# Patient Record
Sex: MALE | Race: WHITE | Employment: FULL TIME | URBAN - METROPOLITAN AREA
[De-identification: names, ages, dates, MRNs, and addresses within clinical notes are randomized per-mention and may not be internally consistent; named-entity substitution may affect disease eponyms.]

---

## 2017-01-01 ENCOUNTER — HOSPITAL ENCOUNTER (OUTPATIENT)
Facility: HOSPITAL | Age: 47
Setting detail: HOSPITAL OUTPATIENT SURGERY
Discharge: HOME | End: 2017-01-01
Attending: INTERNAL MEDICINE | Admitting: INTERNAL MEDICINE
Payer: COMMERCIAL

## 2017-01-01 ENCOUNTER — SURGERY (OUTPATIENT)
Age: 47
End: 2017-01-01

## 2017-01-01 ENCOUNTER — HOSPITAL ENCOUNTER (OUTPATIENT)
Facility: HOSPITAL | Age: 47
Setting detail: OBSERVATION
Discharge: HOME | End: 2017-01-01
Attending: HOSPITALIST | Admitting: HOSPITALIST
Payer: COMMERCIAL

## 2017-01-01 VITALS
HEIGHT: 78 IN | BODY MASS INDEX: 24.88 KG/M2 | WEIGHT: 215 LBS | SYSTOLIC BLOOD PRESSURE: 102 MMHG | OXYGEN SATURATION: 95 % | RESPIRATION RATE: 18 BRPM | DIASTOLIC BLOOD PRESSURE: 66 MMHG | TEMPERATURE: 99 F | HEART RATE: 72 BPM

## 2017-01-01 VITALS
BODY MASS INDEX: 24 KG/M2 | WEIGHT: 209.31 LBS | HEART RATE: 82 BPM | OXYGEN SATURATION: 96 % | SYSTOLIC BLOOD PRESSURE: 115 MMHG | TEMPERATURE: 98 F | DIASTOLIC BLOOD PRESSURE: 72 MMHG | RESPIRATION RATE: 20 BRPM

## 2017-01-01 PROCEDURE — 85610 PROTHROMBIN TIME: CPT | Performed by: HOSPITALIST

## 2017-01-01 PROCEDURE — 96372 THER/PROPH/DIAG INJ SC/IM: CPT

## 2017-01-01 PROCEDURE — 88342 IMHCHEM/IMCYTCHM 1ST ANTB: CPT | Performed by: INTERNAL MEDICINE

## 2017-01-01 PROCEDURE — 96361 HYDRATE IV INFUSION ADD-ON: CPT

## 2017-01-01 PROCEDURE — 96376 TX/PRO/DX INJ SAME DRUG ADON: CPT

## 2017-01-01 PROCEDURE — 88172 CYTP DX EVAL FNA 1ST EA SITE: CPT | Performed by: INTERNAL MEDICINE

## 2017-01-01 PROCEDURE — 81235 EGFR GENE COM VARIANTS: CPT | Performed by: INTERNAL MEDICINE

## 2017-01-01 PROCEDURE — 88305 TISSUE EXAM BY PATHOLOGIST: CPT | Performed by: INTERNAL MEDICINE

## 2017-01-01 PROCEDURE — 88173 CYTOPATH EVAL FNA REPORT: CPT | Performed by: INTERNAL MEDICINE

## 2017-01-01 PROCEDURE — 88341 IMHCHEM/IMCYTCHM EA ADD ANTB: CPT | Performed by: INTERNAL MEDICINE

## 2017-01-01 PROCEDURE — 85025 COMPLETE CBC W/AUTO DIFF WBC: CPT | Performed by: HOSPITALIST

## 2017-01-01 PROCEDURE — 83735 ASSAY OF MAGNESIUM: CPT | Performed by: HOSPITALIST

## 2017-01-01 PROCEDURE — 80053 COMPREHEN METABOLIC PANEL: CPT | Performed by: HOSPITALIST

## 2017-01-01 PROCEDURE — 07978ZX DRAINAGE OF THORAX LYMPHATIC, VIA NATURAL OR ARTIFICIAL OPENING ENDOSCOPIC APPROACH, DIAGNOSTIC: ICD-10-PCS | Performed by: INTERNAL MEDICINE

## 2017-01-01 PROCEDURE — 85652 RBC SED RATE AUTOMATED: CPT | Performed by: HOSPITALIST

## 2017-01-01 PROCEDURE — 88381 MICRODISSECTION MANUAL: CPT | Performed by: INTERNAL MEDICINE

## 2017-01-01 PROCEDURE — 86140 C-REACTIVE PROTEIN: CPT | Performed by: HOSPITALIST

## 2017-01-01 PROCEDURE — 88360 TUMOR IMMUNOHISTOCHEM/MANUAL: CPT | Performed by: INTERNAL MEDICINE

## 2017-01-01 PROCEDURE — 87040 BLOOD CULTURE FOR BACTERIA: CPT | Performed by: HOSPITALIST

## 2017-01-01 PROCEDURE — 96365 THER/PROPH/DIAG IV INF INIT: CPT

## 2017-01-01 RX ORDER — ONDANSETRON 2 MG/ML
4 INJECTION INTRAMUSCULAR; INTRAVENOUS AS NEEDED
Status: DISCONTINUED | OUTPATIENT
Start: 2017-01-01 | End: 2017-01-01

## 2017-01-01 RX ORDER — ONDANSETRON 2 MG/ML
4 INJECTION INTRAMUSCULAR; INTRAVENOUS EVERY 6 HOURS PRN
Status: DISCONTINUED | OUTPATIENT
Start: 2017-01-01 | End: 2017-01-01

## 2017-01-01 RX ORDER — ENOXAPARIN SODIUM 100 MG/ML
1 INJECTION SUBCUTANEOUS EVERY 12 HOURS SCHEDULED
Status: DISCONTINUED | OUTPATIENT
Start: 2017-01-01 | End: 2017-01-01

## 2017-01-01 RX ORDER — NALOXONE HYDROCHLORIDE 0.4 MG/ML
80 INJECTION, SOLUTION INTRAMUSCULAR; INTRAVENOUS; SUBCUTANEOUS AS NEEDED
Status: DISCONTINUED | OUTPATIENT
Start: 2017-01-01 | End: 2017-01-01

## 2017-01-01 RX ORDER — CEPHALEXIN 500 MG/1
500 TABLET ORAL 4 TIMES DAILY
Qty: 40 TABLET | Refills: 0 | Status: SHIPPED | OUTPATIENT
Start: 2017-01-01 | End: 2017-01-01

## 2017-01-01 RX ORDER — HYDROMORPHONE HYDROCHLORIDE 1 MG/ML
0.4 INJECTION, SOLUTION INTRAMUSCULAR; INTRAVENOUS; SUBCUTANEOUS EVERY 5 MIN PRN
Status: DISCONTINUED | OUTPATIENT
Start: 2017-01-01 | End: 2017-01-01

## 2017-01-01 RX ORDER — ENOXAPARIN SODIUM 100 MG/ML
1 INJECTION SUBCUTANEOUS EVERY 12 HOURS SCHEDULED
Qty: 60 SYRINGE | Refills: 1 | Status: SHIPPED | OUTPATIENT
Start: 2017-01-01 | End: 2017-01-01

## 2017-01-01 RX ORDER — SODIUM CHLORIDE, SODIUM LACTATE, POTASSIUM CHLORIDE, CALCIUM CHLORIDE 600; 310; 30; 20 MG/100ML; MG/100ML; MG/100ML; MG/100ML
INJECTION, SOLUTION INTRAVENOUS CONTINUOUS
Status: CANCELLED | OUTPATIENT
Start: 2017-01-01

## 2017-01-01 RX ORDER — SODIUM CHLORIDE 9 MG/ML
INJECTION, SOLUTION INTRAVENOUS CONTINUOUS
Status: DISCONTINUED | OUTPATIENT
Start: 2017-01-01 | End: 2017-01-01

## 2017-01-01 RX ORDER — METOCLOPRAMIDE 10 MG/1
10 TABLET ORAL 3 TIMES DAILY PRN
Status: DISCONTINUED | OUTPATIENT
Start: 2017-01-01 | End: 2017-01-01

## 2017-01-01 RX ORDER — ONDANSETRON 4 MG/1
8 TABLET, FILM COATED ORAL EVERY 8 HOURS PRN
Status: DISCONTINUED | OUTPATIENT
Start: 2017-01-01 | End: 2017-01-01

## 2017-01-01 RX ORDER — ACETAMINOPHEN 325 MG/1
650 TABLET ORAL EVERY 6 HOURS PRN
Status: DISCONTINUED | OUTPATIENT
Start: 2017-01-01 | End: 2017-01-01

## 2017-01-01 RX ORDER — FOLIC ACID 1 MG/1
1 TABLET ORAL DAILY
Status: DISCONTINUED | OUTPATIENT
Start: 2017-01-01 | End: 2017-01-01

## 2017-01-01 RX ORDER — SCOLOPAMINE TRANSDERMAL SYSTEM 1 MG/1
1 PATCH, EXTENDED RELEASE TRANSDERMAL
Status: DISCONTINUED | OUTPATIENT
Start: 2017-01-01 | End: 2017-01-01

## 2017-01-01 RX ORDER — SODIUM CHLORIDE, SODIUM LACTATE, POTASSIUM CHLORIDE, CALCIUM CHLORIDE 600; 310; 30; 20 MG/100ML; MG/100ML; MG/100ML; MG/100ML
INJECTION, SOLUTION INTRAVENOUS CONTINUOUS
Status: DISCONTINUED | OUTPATIENT
Start: 2017-01-01 | End: 2017-01-01

## 2017-09-08 PROBLEM — R05.9 COUGH: Status: ACTIVE | Noted: 2017-01-01

## 2017-09-08 PROBLEM — J18.1 RIGHT UPPER LOBE CONSOLIDATION (HCC): Status: ACTIVE | Noted: 2017-01-01

## 2017-09-14 PROBLEM — M89.9 LESION OF VERTEBRA: Status: ACTIVE | Noted: 2017-01-01

## 2017-10-11 NOTE — OPERATIVE REPORT
EBUS Bronchoscopy Procedure Report     Preop diagnosis:        Abnormal CT scan, mediastinal adenopathy  Postop diagnosis:       Abnormal CT scan, mediastinal adenopathy  Procedure performed:  Bronchoscopy, EBUS-TBNA     Sedation used:      General Anesthe Patient tolerated the procedure well and was transferred to the recovery area. EBL was minimal    Samples obtained:                   1. EBUS-TBNA, station 4L lymph node                  2. EBUS-TBNA, station 4R lymph node. Recommendations:     Sebastian Pantoja

## 2017-10-11 NOTE — H&P
Preprocedure Note    Reviewed notes from Dr. Mona Abel dated 9/19//17. There has been no interval change. Plan is to proceed w/ bronch/EBUS-TBNA under anesthesia. Brandi Boothe M.D.   Pulmonary/Critical Care

## 2017-10-20 PROBLEM — C34.11 MALIGNANT NEOPLASM OF UPPER LOBE OF RIGHT LUNG (HCC): Status: ACTIVE | Noted: 2017-01-01

## 2017-11-17 PROBLEM — R11.0 NAUSEA: Status: ACTIVE | Noted: 2017-01-01

## 2017-11-19 PROBLEM — K21.9 ACID REFLUX: Status: ACTIVE | Noted: 2017-01-01

## 2017-11-21 PROBLEM — I82.629 ARM DVT (DEEP VENOUS THROMBOEMBOLISM), ACUTE (HCC): Status: ACTIVE | Noted: 2017-01-01

## 2017-11-21 NOTE — PROGRESS NOTES
No complaints of pain. Dr Dqeuan Garland notified, admission orders received. Dr Willis Ascencio notified by [facility] immediate care. Iv fluids initiated. Blood cultures drawn. Non productive cough, which patient states is chronic. sats 97% on room air. Slept well.  TELE on

## 2017-11-21 NOTE — PAYOR COMM NOTE
--------------  ADMISSION REVIEW     Payor: Thanh Boyer  #:  [id number]  Authorization Number: N/A    Admit date: 11/20/17  Admit time: 2257       Admitting Physician: Narcisa Crabtree DO  Attending Physician:  Madonna Ewing MD  Prim mg Subcutaneous (Left Lower Abdomen) Alesha Miguel, RN      folic acid (FOLVITE) tab 1 mg     Date Action Dose Route User    11/21/2017 0903 Given 1 mg Oral Miguel Solano RN      0.9%  NaCl infusion     Date Action Dose Route User    11/21/2017 177

## 2017-11-21 NOTE — PLAN OF CARE
NURSING ADMISSION NOTE      Patient admitted via cart  Oriented to room. Safety precautions initiated. Bed in low position. Call light in reach.

## 2017-11-21 NOTE — PROGRESS NOTES
Patient denies c/o pain at this time still has a dry nonproductive cough. l noted to eft arm redden area patient states that it looks much better than it did when he arrived. Able to move left arm without any problems. explained lovenox injection to patient

## 2017-11-21 NOTE — CONSULTS
[facility]  Report of Consultation    Kayce Hagen Patient Status:  Observation    1970 MRN [id number]   [facility] 4NW-A Attending Telly Sanchez MD   Hosp Day # 0 PCP Rochelle Felder MD     REASON FOR CONSULTATION:     Deep v Continuous  •  acetaminophen (TYLENOL) tab 650 mg, 650 mg, Oral, Q6H PRN  •  ondansetron HCl (ZOFRAN) injection 4 mg, 4 mg, Intravenous, Q6H PRN  •  enoxaparin sodium (LOVENOX) 100 MG/ML injection 100 mg, 1 mg/kg, Subcutaneous, 2 times per day  •  CeFAZoli Lab  11/20/17   1925  11/21/17   0629   RBC  4.74  4.84   HGB  13.5  13.7   HCT  39.9  40.5   MCV  84.2  83.7   MCH  28.5  28.3   MCHC  33.8  33.8   RDW  14.8  14.7   NEPRELIM   --   3.49   WBC  8.15  6.1   PLT  238  219.0             Imaging:    US  Occ

## 2017-11-21 NOTE — H&P
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 OBJECTIVE:  /74 (BP Location: Right arm)   Pulse 85   Temp 99.1 °F (37.3 °C) (Oral)   Resp 20   Wt 211 lb 3.2 oz (95.8 kg)   SpO2 98%   BMI 23.79 kg/m²   General:  Alert, no distress, appears stated age.    Head: restricted diffusion is noted. Axial gradient echo images demonstrate no microhemorrhages. No abnormal brain parenchymal signal is identified. With the administration of gadolinium, no abnormally enhancing lesions are identified.  No mass, mass effect, or m right upper lobe along with right hilar and right mediastinal lymphadenopathy. Us Venous Doppler Arm Left - Diag Img (cpt=[id number])    Result Date: 11/20/2017  LEFT UPPER EXTREMITY VENOUS DOPPLER ULTRASOUND CLINICAL HISTORY: Acute lymphangitis.  [employer], unenhanced study performed for radiation planning purposes only.             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

## 2017-11-22 NOTE — PROGRESS NOTES
Alert and oreinted x 4, v.s.s.  Ambulatory in room independently with steady gait no c/o pain or signs of distress, redness noted to Left upper arm within margins, continues on antibiotics, discharge pending, possibly tomorrow, pt comfortable this shift, co

## 2017-11-22 NOTE — DISCHARGE SUMMARY
General Medicine Discharge Summary     Patient ID:  Ayala Bazzi  52year old  5/25/1970    Admit date: 11/20/2017    Discharge date and time: 11/22/2017  2:33 PM     Attending Physician: Keshia Merida no LE swelling. At time of my interview states that the redness of the arm appears less intense, but that the area may have moved upward.      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     Ondansetron HCl (ZOFRAN) 8 MG tablet  Take 1 tablet (8 mg total) by mouth every 8 (eight) hours as needed for Nausea., Normal, Disp-20 tablet, R-3Every 8 hours prn nausea or as ins

## 2017-11-22 NOTE — PROGRESS NOTES
[facility]  Progress Note    Danielle Vassar Patient Status:  Observation    1970 MRN [id number]   [facility] 4NW-A Attending Doroteo Sarmiento MD   Hosp Day # 0 PCP Lyubov Huddleston MD     Subjective:    Left arm is less swollen  Oziel

## 2017-11-22 NOTE — CM/SW NOTE
Patient will have a $10 copay for Lovenox, it is ready to be picked up at his [address] in [place]. Patient is aware and agreeable.     Janie Croft RN, [facility]/  [phone]

## 2017-11-22 NOTE — PROGRESS NOTES
Patient discharged to home explained all medications to patient and follow up appt. Patient verbalized his understanding of teaching.  Patient gave himself his lovenox injection this am.

## 2017-11-22 NOTE — PROGRESS NOTES
Assumed patient care at 2300. Patient comfortably resting in bed. Alert and oriented X4. Left arm still noted with reddness and warm to touch. Maintained on IV Ancef for Left upper arm Cellulitis. Scheduled Lovenox for DVT of the Left arm as well.  No com

## 2017-12-07 PROBLEM — R74.8 ELEVATED LIVER ENZYMES: Status: ACTIVE | Noted: 2017-01-01

## 2017-12-07 PROBLEM — K59.00 CONSTIPATION: Status: ACTIVE | Noted: 2017-01-01

## 2017-12-22 PROBLEM — K22.89 ESOPHAGEAL PAIN: Status: ACTIVE | Noted: 2017-01-01

## 2018-01-01 ENCOUNTER — APPOINTMENT (OUTPATIENT)
Dept: CT IMAGING | Facility: HOSPITAL | Age: 48
DRG: 208 | End: 2018-01-01
Attending: HOSPITALIST
Payer: COMMERCIAL

## 2018-01-01 ENCOUNTER — APPOINTMENT (OUTPATIENT)
Dept: GENERAL RADIOLOGY | Facility: HOSPITAL | Age: 48
DRG: 208 | End: 2018-01-01
Attending: HOSPITALIST
Payer: COMMERCIAL

## 2018-01-01 ENCOUNTER — HOSPITAL ENCOUNTER (INPATIENT)
Facility: HOSPITAL | Age: 48
LOS: 22 days | DRG: 208 | End: 2018-01-01
Attending: EMERGENCY MEDICINE | Admitting: INTERNAL MEDICINE
Payer: COMMERCIAL

## 2018-01-01 ENCOUNTER — APPOINTMENT (OUTPATIENT)
Dept: GENERAL RADIOLOGY | Facility: HOSPITAL | Age: 48
DRG: 208 | End: 2018-01-01
Attending: INTERNAL MEDICINE
Payer: COMMERCIAL

## 2018-01-01 ENCOUNTER — HOSPITAL ENCOUNTER (INPATIENT)
Facility: HOSPITAL | Age: 48
LOS: 3 days | Discharge: HOME | DRG: 478 | End: 2018-01-01
Attending: EMERGENCY MEDICINE | Admitting: INTERNAL MEDICINE
Payer: COMMERCIAL

## 2018-01-01 ENCOUNTER — APPOINTMENT (OUTPATIENT)
Dept: ULTRASOUND IMAGING | Facility: HOSPITAL | Age: 48
DRG: 208 | End: 2018-01-01
Attending: INTERNAL MEDICINE
Payer: COMMERCIAL

## 2018-01-01 ENCOUNTER — APPOINTMENT (OUTPATIENT)
Dept: CV DIAGNOSTICS | Facility: HOSPITAL | Age: 48
DRG: 208 | End: 2018-01-01
Attending: INTERNAL MEDICINE
Payer: COMMERCIAL

## 2018-01-01 ENCOUNTER — APPOINTMENT (OUTPATIENT)
Dept: CT IMAGING | Facility: HOSPITAL | Age: 48
DRG: 208 | End: 2018-01-01
Attending: INTERNAL MEDICINE
Payer: COMMERCIAL

## 2018-01-01 ENCOUNTER — APPOINTMENT (OUTPATIENT)
Dept: MRI IMAGING | Facility: HOSPITAL | Age: 48
DRG: 208 | End: 2018-01-01
Attending: HOSPITALIST
Payer: COMMERCIAL

## 2018-01-01 ENCOUNTER — HOSPITAL ENCOUNTER (INPATIENT)
Facility: HOSPITAL | Age: 48
LOS: 5 days | Discharge: HOME | DRG: 369 | End: 2018-01-01
Attending: EMERGENCY MEDICINE | Admitting: INTERNAL MEDICINE
Payer: COMMERCIAL

## 2018-01-01 ENCOUNTER — APPOINTMENT (OUTPATIENT)
Dept: MRI IMAGING | Facility: HOSPITAL | Age: 48
DRG: 478 | End: 2018-01-01
Attending: HOSPITALIST
Payer: COMMERCIAL

## 2018-01-01 ENCOUNTER — APPOINTMENT (OUTPATIENT)
Dept: INTERVENTIONAL RADIOLOGY/VASCULAR | Facility: HOSPITAL | Age: 48
DRG: 478 | End: 2018-01-01
Attending: HOSPITALIST
Payer: COMMERCIAL

## 2018-01-01 ENCOUNTER — APPOINTMENT (OUTPATIENT)
Dept: GENERAL RADIOLOGY | Facility: HOSPITAL | Age: 48
DRG: 208 | End: 2018-01-01
Attending: EMERGENCY MEDICINE
Payer: COMMERCIAL

## 2018-01-01 ENCOUNTER — APPOINTMENT (OUTPATIENT)
Dept: CT IMAGING | Facility: HOSPITAL | Age: 48
DRG: 208 | End: 2018-01-01
Attending: EMERGENCY MEDICINE
Payer: COMMERCIAL

## 2018-01-01 ENCOUNTER — ANESTHESIA EVENT (OUTPATIENT)
Dept: MEDSURG UNIT | Facility: HOSPITAL | Age: 48
DRG: 208 | End: 2018-01-01
Payer: COMMERCIAL

## 2018-01-01 ENCOUNTER — TELEPHONE (OUTPATIENT)
Dept: HEMATOLOGY/ONCOLOGY | Facility: HOSPITAL | Age: 48
End: 2018-01-01

## 2018-01-01 ENCOUNTER — ANESTHESIA (OUTPATIENT)
Dept: MEDSURG UNIT | Facility: HOSPITAL | Age: 48
DRG: 208 | End: 2018-01-01
Payer: COMMERCIAL

## 2018-01-01 VITALS
DIASTOLIC BLOOD PRESSURE: 51 MMHG | OXYGEN SATURATION: 14 % | HEIGHT: 78 IN | SYSTOLIC BLOOD PRESSURE: 73 MMHG | TEMPERATURE: 101 F | BODY MASS INDEX: 16.43 KG/M2 | WEIGHT: 142 LBS

## 2018-01-01 VITALS
OXYGEN SATURATION: 91 % | BODY MASS INDEX: 19.8 KG/M2 | HEART RATE: 105 BPM | SYSTOLIC BLOOD PRESSURE: 149 MMHG | DIASTOLIC BLOOD PRESSURE: 106 MMHG | RESPIRATION RATE: 16 BRPM | HEIGHT: 78 IN | WEIGHT: 171.13 LBS | TEMPERATURE: 98 F

## 2018-01-01 VITALS
OXYGEN SATURATION: 100 % | SYSTOLIC BLOOD PRESSURE: 145 MMHG | BODY MASS INDEX: 20.34 KG/M2 | HEIGHT: 78 IN | DIASTOLIC BLOOD PRESSURE: 90 MMHG | TEMPERATURE: 98 F | WEIGHT: 175.81 LBS | RESPIRATION RATE: 18 BRPM | HEART RATE: 86 BPM

## 2018-01-01 DIAGNOSIS — A41.9 SEPSIS DUE TO PNEUMONIA (HCC): ICD-10-CM

## 2018-01-01 DIAGNOSIS — R09.02 HYPOXIA: ICD-10-CM

## 2018-01-01 DIAGNOSIS — R11.10 INTRACTABLE VOMITING, PRESENCE OF NAUSEA NOT SPECIFIED, UNSPECIFIED VOMITING TYPE: Primary | ICD-10-CM

## 2018-01-01 DIAGNOSIS — E86.0 DEHYDRATION: ICD-10-CM

## 2018-01-01 DIAGNOSIS — M54.5 LOW BACK PAIN, UNSPECIFIED BACK PAIN LATERALITY, UNSPECIFIED CHRONICITY, WITH SCIATICA PRESENCE UNSPECIFIED: ICD-10-CM

## 2018-01-01 DIAGNOSIS — J20.9 ACUTE BRONCHITIS, UNSPECIFIED ORGANISM: Primary | ICD-10-CM

## 2018-01-01 DIAGNOSIS — R52 INTRACTABLE PAIN: Primary | ICD-10-CM

## 2018-01-01 DIAGNOSIS — R63.4 WEIGHT LOSS: ICD-10-CM

## 2018-01-01 DIAGNOSIS — C34.11 MALIGNANT NEOPLASM OF UPPER LOBE OF RIGHT LUNG (HCC): ICD-10-CM

## 2018-01-01 DIAGNOSIS — R11.2 INTRACTABLE VOMITING WITH NAUSEA, UNSPECIFIED VOMITING TYPE: ICD-10-CM

## 2018-01-01 DIAGNOSIS — K22.89 ESOPHAGEAL PAIN: ICD-10-CM

## 2018-01-01 DIAGNOSIS — N17.9 ACUTE KIDNEY INJURY (HCC): ICD-10-CM

## 2018-01-01 DIAGNOSIS — E87.6 HYPOKALEMIA: ICD-10-CM

## 2018-01-01 DIAGNOSIS — R07.9 RIGHT-SIDED CHEST PAIN: ICD-10-CM

## 2018-01-01 DIAGNOSIS — J18.9 SEPSIS DUE TO PNEUMONIA (HCC): ICD-10-CM

## 2018-01-01 DIAGNOSIS — E87.1 HYPONATREMIA: ICD-10-CM

## 2018-01-01 DIAGNOSIS — C34.90 PRIMARY MALIGNANT NEOPLASM OF LUNG METASTATIC TO OTHER SITE, UNSPECIFIED LATERALITY (HCC): ICD-10-CM

## 2018-01-01 LAB
ANION GAP SERPL CALC-SCNC: 11 MMOL/L (ref 0–18)
ANION GAP SERPL CALC-SCNC: 15 MMOL/L (ref 0–18)
ANION GAP SERPL CALC-SCNC: 6 MMOL/L (ref 0–18)
ANION GAP SERPL CALC-SCNC: 8 MMOL/L (ref 0–18)
BASOPHILS # BLD: 0 K/UL (ref 0–0.2)
BASOPHILS NFR BLD: 0 %
BUN SERPL-MCNC: 10 MG/DL (ref 8–20)
BUN SERPL-MCNC: 5 MG/DL (ref 8–20)
BUN SERPL-MCNC: 6 MG/DL (ref 8–20)
BUN SERPL-MCNC: 7 MG/DL (ref 8–20)
BUN SERPL-MCNC: 7 MG/DL (ref 8–20)
BUN SERPL-MCNC: 9 MG/DL (ref 8–20)
BUN SERPL-MCNC: 9 MG/DL (ref 8–20)
BUN/CREAT SERPL: 10.5 (ref 10–20)
BUN/CREAT SERPL: 4.8 (ref 10–20)
BUN/CREAT SERPL: 5.2 (ref 10–20)
BUN/CREAT SERPL: 5.4 (ref 10–20)
BUN/CREAT SERPL: 6.1 (ref 10–20)
BUN/CREAT SERPL: 8.3 (ref 10–20)
BUN/CREAT SERPL: 8.5 (ref 10–20)
CALCIUM SERPL-MCNC: 8.6 MG/DL (ref 8.5–10.5)
CALCIUM SERPL-MCNC: 8.8 MG/DL (ref 8.5–10.5)
CALCIUM SERPL-MCNC: 8.8 MG/DL (ref 8.5–10.5)
CALCIUM SERPL-MCNC: 8.9 MG/DL (ref 8.5–10.5)
CALCIUM SERPL-MCNC: 9.1 MG/DL (ref 8.5–10.5)
CALCIUM SERPL-MCNC: 9.2 MG/DL (ref 8.5–10.5)
CALCIUM SERPL-MCNC: 9.4 MG/DL (ref 8.5–10.5)
CHLORIDE SERPL-SCNC: 102 MMOL/L (ref 95–110)
CHLORIDE SERPL-SCNC: 103 MMOL/L (ref 95–110)
CHLORIDE SERPL-SCNC: 106 MMOL/L (ref 95–110)
CHLORIDE SERPL-SCNC: 96 MMOL/L (ref 95–110)
CHLORIDE SERPL-SCNC: 97 MMOL/L (ref 95–110)
CO2 SERPL-SCNC: 23 MMOL/L (ref 22–32)
CO2 SERPL-SCNC: 24 MMOL/L (ref 22–32)
CO2 SERPL-SCNC: 25 MMOL/L (ref 22–32)
CO2 SERPL-SCNC: 26 MMOL/L (ref 22–32)
CREAT SERPL-MCNC: 0.95 MG/DL (ref 0.5–1.5)
CREAT SERPL-MCNC: 1.05 MG/DL (ref 0.5–1.5)
CREAT SERPL-MCNC: 1.06 MG/DL (ref 0.5–1.5)
CREAT SERPL-MCNC: 1.09 MG/DL (ref 0.5–1.5)
CREAT SERPL-MCNC: 1.14 MG/DL (ref 0.5–1.5)
CREAT SERPL-MCNC: 1.16 MG/DL (ref 0.5–1.5)
CREAT SERPL-MCNC: 1.3 MG/DL (ref 0.5–1.5)
EOSINOPHIL # BLD: 0 K/UL (ref 0–0.7)
EOSINOPHIL # BLD: 0.1 K/UL (ref 0–0.7)
EOSINOPHIL # BLD: 0.1 K/UL (ref 0–0.7)
EOSINOPHIL NFR BLD: 0 %
EOSINOPHIL NFR BLD: 0 %
EOSINOPHIL NFR BLD: 1 %
EOSINOPHIL NFR BLD: 3 %
EOSINOPHIL NFR BLD: 3 %
ERYTHROCYTE [DISTWIDTH] IN BLOOD BY AUTOMATED COUNT: 16.6 % (ref 11–15)
ERYTHROCYTE [DISTWIDTH] IN BLOOD BY AUTOMATED COUNT: 16.6 % (ref 11–15)
ERYTHROCYTE [DISTWIDTH] IN BLOOD BY AUTOMATED COUNT: 16.7 % (ref 11–15)
ERYTHROCYTE [DISTWIDTH] IN BLOOD BY AUTOMATED COUNT: 17 % (ref 11–15)
ERYTHROCYTE [DISTWIDTH] IN BLOOD BY AUTOMATED COUNT: 17.2 % (ref 11–15)
GLUCOSE SERPL-MCNC: 100 MG/DL (ref 70–99)
GLUCOSE SERPL-MCNC: 102 MG/DL (ref 70–99)
GLUCOSE SERPL-MCNC: 103 MG/DL (ref 70–99)
GLUCOSE SERPL-MCNC: 119 MG/DL (ref 70–99)
GLUCOSE SERPL-MCNC: 127 MG/DL (ref 70–99)
GLUCOSE SERPL-MCNC: 136 MG/DL (ref 70–99)
GLUCOSE SERPL-MCNC: 148 MG/DL (ref 70–99)
HCT VFR BLD AUTO: 23.9 % (ref 41–52)
HCT VFR BLD AUTO: 24.5 % (ref 41–52)
HCT VFR BLD AUTO: 25.8 % (ref 41–52)
HCT VFR BLD AUTO: 26.7 % (ref 41–52)
HCT VFR BLD AUTO: 27.1 % (ref 41–52)
HGB BLD-MCNC: 8.4 G/DL (ref 13.5–17.5)
HGB BLD-MCNC: 8.4 G/DL (ref 13.5–17.5)
HGB BLD-MCNC: 9.1 G/DL (ref 13.5–17.5)
HGB BLD-MCNC: 9.2 G/DL (ref 13.5–17.5)
HGB BLD-MCNC: 9.5 G/DL (ref 13.5–17.5)
LYMPHOCYTES # BLD: 0.6 K/UL (ref 1–4)
LYMPHOCYTES # BLD: 0.7 K/UL (ref 1–4)
LYMPHOCYTES # BLD: 1.3 K/UL (ref 1–4)
LYMPHOCYTES # BLD: 1.4 K/UL (ref 1–4)
LYMPHOCYTES # BLD: 1.7 K/UL (ref 1–4)
LYMPHOCYTES NFR BLD: 35 %
LYMPHOCYTES NFR BLD: 35 %
LYMPHOCYTES NFR BLD: 38 %
LYMPHOCYTES NFR BLD: 50 %
LYMPHOCYTES NFR BLD: 57 %
MCH RBC QN AUTO: 30.7 PG (ref 27–32)
MCH RBC QN AUTO: 30.9 PG (ref 27–32)
MCH RBC QN AUTO: 31.1 PG (ref 27–32)
MCH RBC QN AUTO: 31.1 PG (ref 27–32)
MCH RBC QN AUTO: 31.5 PG (ref 27–32)
MCHC RBC AUTO-ENTMCNC: 34.2 G/DL (ref 32–37)
MCHC RBC AUTO-ENTMCNC: 34.4 G/DL (ref 32–37)
MCHC RBC AUTO-ENTMCNC: 34.9 G/DL (ref 32–37)
MCHC RBC AUTO-ENTMCNC: 35 G/DL (ref 32–37)
MCHC RBC AUTO-ENTMCNC: 35.4 G/DL (ref 32–37)
MCV RBC AUTO: 88.5 FL (ref 80–100)
MCV RBC AUTO: 89 FL (ref 80–100)
MCV RBC AUTO: 89.1 FL (ref 80–100)
MCV RBC AUTO: 89.8 FL (ref 80–100)
MCV RBC AUTO: 90.3 FL (ref 80–100)
MONOCYTES # BLD: 0.4 K/UL (ref 0–1)
MONOCYTES # BLD: 0.5 K/UL (ref 0–1)
MONOCYTES # BLD: 0.7 K/UL (ref 0–1)
MONOCYTES NFR BLD: 13 %
MONOCYTES NFR BLD: 13 %
MONOCYTES NFR BLD: 19 %
MONOCYTES NFR BLD: 21 %
MONOCYTES NFR BLD: 27 %
NEUTROPHILS # BLD AUTO: 0.7 K/UL (ref 1.8–7.7)
NEUTROPHILS # BLD AUTO: 0.7 K/UL (ref 1.8–7.7)
NEUTROPHILS # BLD AUTO: 0.8 K/UL (ref 1.8–7.7)
NEUTROPHILS # BLD AUTO: 1 K/UL (ref 1.8–7.7)
NEUTROPHILS # BLD AUTO: 1.6 K/UL (ref 1.8–7.7)
NEUTROPHILS NFR BLD: 28 %
NEUTROPHILS NFR BLD: 36 %
NEUTROPHILS NFR BLD: 37 %
NEUTROPHILS NFR BLD: 41 %
NEUTROPHILS NFR BLD: 44 %
OSMOLALITY UR CALC.SUM OF ELEC: 267 MOSM/KG (ref 275–295)
OSMOLALITY UR CALC.SUM OF ELEC: 269 MOSM/KG (ref 275–295)
OSMOLALITY UR CALC.SUM OF ELEC: 274 MOSM/KG (ref 275–295)
OSMOLALITY UR CALC.SUM OF ELEC: 278 MOSM/KG (ref 275–295)
OSMOLALITY UR CALC.SUM OF ELEC: 280 MOSM/KG (ref 275–295)
OSMOLALITY UR CALC.SUM OF ELEC: 280 MOSM/KG (ref 275–295)
OSMOLALITY UR CALC.SUM OF ELEC: 282 MOSM/KG (ref 275–295)
PLATELET # BLD AUTO: 207 K/UL (ref 140–400)
PLATELET # BLD AUTO: 245 K/UL (ref 140–400)
PLATELET # BLD AUTO: 262 K/UL (ref 140–400)
PLATELET # BLD AUTO: 319 K/UL (ref 140–400)
PLATELET # BLD AUTO: 356 K/UL (ref 140–400)
PMV BLD AUTO: 7.3 FL (ref 7.4–10.3)
PMV BLD AUTO: 7.6 FL (ref 7.4–10.3)
PMV BLD AUTO: 7.7 FL (ref 7.4–10.3)
PMV BLD AUTO: 7.7 FL (ref 7.4–10.3)
PMV BLD AUTO: 7.8 FL (ref 7.4–10.3)
POTASSIUM SERPL-SCNC: 3 MMOL/L (ref 3.3–5.1)
POTASSIUM SERPL-SCNC: 3.3 MMOL/L (ref 3.3–5.1)
POTASSIUM SERPL-SCNC: 3.3 MMOL/L (ref 3.3–5.1)
POTASSIUM SERPL-SCNC: 3.4 MMOL/L (ref 3.3–5.1)
POTASSIUM SERPL-SCNC: 3.5 MMOL/L (ref 3.3–5.1)
POTASSIUM SERPL-SCNC: 3.9 MMOL/L (ref 3.3–5.1)
RBC # BLD AUTO: 2.68 M/UL (ref 4.5–5.9)
RBC # BLD AUTO: 2.73 M/UL (ref 4.5–5.9)
RBC # BLD AUTO: 2.9 M/UL (ref 4.5–5.9)
RBC # BLD AUTO: 2.96 M/UL (ref 4.5–5.9)
RBC # BLD AUTO: 3.06 M/UL (ref 4.5–5.9)
SODIUM SERPL-SCNC: 128 MMOL/L (ref 136–144)
SODIUM SERPL-SCNC: 129 MMOL/L (ref 136–144)
SODIUM SERPL-SCNC: 132 MMOL/L (ref 136–144)
SODIUM SERPL-SCNC: 134 MMOL/L (ref 136–144)
SODIUM SERPL-SCNC: 136 MMOL/L (ref 136–144)
SODIUM SERPL-SCNC: 136 MMOL/L (ref 136–144)
SODIUM SERPL-SCNC: 137 MMOL/L (ref 136–144)
WBC # BLD AUTO: 1.7 K/UL (ref 4–11)
WBC # BLD AUTO: 2 K/UL (ref 4–11)
WBC # BLD AUTO: 2.7 K/UL (ref 4–11)
WBC # BLD AUTO: 2.9 K/UL (ref 4–11)
WBC # BLD AUTO: 3.6 K/UL (ref 4–11)

## 2018-01-01 PROCEDURE — 99252 IP/OBS CONSLTJ NEW/EST SF 35: CPT | Performed by: INTERNAL MEDICINE

## 2018-01-01 PROCEDURE — 72158 MRI LUMBAR SPINE W/O & W/DYE: CPT | Performed by: HOSPITALIST

## 2018-01-01 PROCEDURE — 99285 EMERGENCY DEPT VISIT HI MDM: CPT

## 2018-01-01 PROCEDURE — 84300 ASSAY OF URINE SODIUM: CPT | Performed by: PHYSICIAN ASSISTANT

## 2018-01-01 PROCEDURE — 71045 X-RAY EXAM CHEST 1 VIEW: CPT | Performed by: INTERNAL MEDICINE

## 2018-01-01 PROCEDURE — 71045 X-RAY EXAM CHEST 1 VIEW: CPT | Performed by: HOSPITALIST

## 2018-01-01 PROCEDURE — 93010 ELECTROCARDIOGRAM REPORT: CPT | Performed by: EMERGENCY MEDICINE

## 2018-01-01 PROCEDURE — 88333 PATH CONSLTJ SURG CYTO XM 1: CPT | Performed by: HOSPITALIST

## 2018-01-01 PROCEDURE — 96375 TX/PRO/DX INJ NEW DRUG ADDON: CPT

## 2018-01-01 PROCEDURE — 83735 ASSAY OF MAGNESIUM: CPT | Performed by: EMERGENCY MEDICINE

## 2018-01-01 PROCEDURE — 80076 HEPATIC FUNCTION PANEL: CPT | Performed by: EMERGENCY MEDICINE

## 2018-01-01 PROCEDURE — 99231 SBSQ HOSP IP/OBS SF/LOW 25: CPT | Performed by: INTERNAL MEDICINE

## 2018-01-01 PROCEDURE — 71260 CT THORAX DX C+: CPT | Performed by: EMERGENCY MEDICINE

## 2018-01-01 PROCEDURE — 80048 BASIC METABOLIC PNL TOTAL CA: CPT | Performed by: EMERGENCY MEDICINE

## 2018-01-01 PROCEDURE — 99233 SBSQ HOSP IP/OBS HIGH 50: CPT | Performed by: INTERNAL MEDICINE

## 2018-01-01 PROCEDURE — 96376 TX/PRO/DX INJ SAME DRUG ADON: CPT

## 2018-01-01 PROCEDURE — 85025 COMPLETE CBC W/AUTO DIFF WBC: CPT | Performed by: HOSPITALIST

## 2018-01-01 PROCEDURE — 96361 HYDRATE IV INFUSION ADD-ON: CPT

## 2018-01-01 PROCEDURE — 02HV33Z INSERTION OF INFUSION DEVICE INTO SUPERIOR VENA CAVA, PERCUTANEOUS APPROACH: ICD-10-PCS | Performed by: HOSPITALIST

## 2018-01-01 PROCEDURE — 88374 M/PHMTRC ALYS ISHQUANT/SEMIQ: CPT | Performed by: INTERNAL MEDICINE

## 2018-01-01 PROCEDURE — A4216 STERILE WATER/SALINE, 10 ML: HCPCS | Performed by: INTERNAL MEDICINE

## 2018-01-01 PROCEDURE — 88177 CYTP FNA EVAL EA ADDL: CPT | Performed by: HOSPITALIST

## 2018-01-01 PROCEDURE — 81210 BRAF GENE: CPT | Performed by: INTERNAL MEDICINE

## 2018-01-01 PROCEDURE — 76705 ECHO EXAM OF ABDOMEN: CPT | Performed by: INTERNAL MEDICINE

## 2018-01-01 PROCEDURE — A9575 INJ GADOTERATE MEGLUMI 0.1ML: HCPCS | Performed by: HOSPITALIST

## 2018-01-01 PROCEDURE — 80048 BASIC METABOLIC PNL TOTAL CA: CPT | Performed by: HOSPITALIST

## 2018-01-01 PROCEDURE — 85025 COMPLETE CBC W/AUTO DIFF WBC: CPT | Performed by: EMERGENCY MEDICINE

## 2018-01-01 PROCEDURE — 99231 SBSQ HOSP IP/OBS SF/LOW 25: CPT | Performed by: OTHER

## 2018-01-01 PROCEDURE — 85025 COMPLETE CBC W/AUTO DIFF WBC: CPT | Performed by: INTERNAL MEDICINE

## 2018-01-01 PROCEDURE — 99152 MOD SED SAME PHYS/QHP 5/>YRS: CPT

## 2018-01-01 PROCEDURE — 85014 HEMATOCRIT: CPT | Performed by: HOSPITALIST

## 2018-01-01 PROCEDURE — 88307 TISSUE EXAM BY PATHOLOGIST: CPT | Performed by: HOSPITALIST

## 2018-01-01 PROCEDURE — 88341 IMHCHEM/IMCYTCHM EA ADD ANTB: CPT | Performed by: HOSPITALIST

## 2018-01-01 PROCEDURE — 0W993ZZ DRAINAGE OF RIGHT PLEURAL CAVITY, PERCUTANEOUS APPROACH: ICD-10-PCS | Performed by: INTERNAL MEDICINE

## 2018-01-01 PROCEDURE — 93306 TTE W/DOPPLER COMPLETE: CPT | Performed by: INTERNAL MEDICINE

## 2018-01-01 PROCEDURE — 20225 BONE BIOPSY TROCAR/NDL DEEP: CPT

## 2018-01-01 PROCEDURE — 71045 X-RAY EXAM CHEST 1 VIEW: CPT | Performed by: EMERGENCY MEDICINE

## 2018-01-01 PROCEDURE — 71260 CT THORAX DX C+: CPT | Performed by: INTERNAL MEDICINE

## 2018-01-01 PROCEDURE — 88360 TUMOR IMMUNOHISTOCHEM/MANUAL: CPT | Performed by: INTERNAL MEDICINE

## 2018-01-01 PROCEDURE — 88305 TISSUE EXAM BY PATHOLOGIST: CPT | Performed by: HOSPITALIST

## 2018-01-01 PROCEDURE — 88311 DECALCIFY TISSUE: CPT | Performed by: HOSPITALIST

## 2018-01-01 PROCEDURE — 81235 EGFR GENE COM VARIANTS: CPT | Performed by: INTERNAL MEDICINE

## 2018-01-01 PROCEDURE — 83735 ASSAY OF MAGNESIUM: CPT | Performed by: HOSPITALIST

## 2018-01-01 PROCEDURE — 96374 THER/PROPH/DIAG INJ IV PUSH: CPT

## 2018-01-01 PROCEDURE — 83690 ASSAY OF LIPASE: CPT | Performed by: EMERGENCY MEDICINE

## 2018-01-01 PROCEDURE — 84132 ASSAY OF SERUM POTASSIUM: CPT | Performed by: HOSPITALIST

## 2018-01-01 PROCEDURE — 99356 PROLONGED SERV,INPATIENT,1ST HR: CPT | Performed by: INTERNAL MEDICINE

## 2018-01-01 PROCEDURE — 81003 URINALYSIS AUTO W/O SCOPE: CPT | Performed by: EMERGENCY MEDICINE

## 2018-01-01 PROCEDURE — 93005 ELECTROCARDIOGRAM TRACING: CPT

## 2018-01-01 PROCEDURE — 77002 NEEDLE LOCALIZATION BY XRAY: CPT

## 2018-01-01 PROCEDURE — 93970 EXTREMITY STUDY: CPT | Performed by: INTERNAL MEDICINE

## 2018-01-01 PROCEDURE — 5A09457 ASSISTANCE WITH RESPIRATORY VENTILATION, 24-96 CONSECUTIVE HOURS, CONTINUOUS POSITIVE AIRWAY PRESSURE: ICD-10-PCS | Performed by: HOSPITALIST

## 2018-01-01 PROCEDURE — 70450 CT HEAD/BRAIN W/O DYE: CPT | Performed by: HOSPITALIST

## 2018-01-01 PROCEDURE — 5A1945Z RESPIRATORY VENTILATION, 24-96 CONSECUTIVE HOURS: ICD-10-PCS | Performed by: HOSPITALIST

## 2018-01-01 PROCEDURE — 99254 IP/OBS CNSLTJ NEW/EST MOD 60: CPT | Performed by: OTHER

## 2018-01-01 PROCEDURE — 80048 BASIC METABOLIC PNL TOTAL CA: CPT | Performed by: INTERNAL MEDICINE

## 2018-01-01 PROCEDURE — 32555 ASPIRATE PLEURA W/ IMAGING: CPT | Performed by: INTERNAL MEDICINE

## 2018-01-01 PROCEDURE — 0W9B3ZZ DRAINAGE OF LEFT PLEURAL CAVITY, PERCUTANEOUS APPROACH: ICD-10-PCS | Performed by: INTERNAL MEDICINE

## 2018-01-01 PROCEDURE — 0QB03ZX EXCISION OF LUMBAR VERTEBRA, PERCUTANEOUS APPROACH, DIAGNOSTIC: ICD-10-PCS | Performed by: RADIOLOGY

## 2018-01-01 PROCEDURE — 74018 RADEX ABDOMEN 1 VIEW: CPT | Performed by: INTERNAL MEDICINE

## 2018-01-01 PROCEDURE — 30233N1 TRANSFUSION OF NONAUTOLOGOUS RED BLOOD CELLS INTO PERIPHERAL VEIN, PERCUTANEOUS APPROACH: ICD-10-PCS | Performed by: HOSPITALIST

## 2018-01-01 PROCEDURE — 85018 HEMOGLOBIN: CPT | Performed by: HOSPITALIST

## 2018-01-01 PROCEDURE — 99232 SBSQ HOSP IP/OBS MODERATE 35: CPT | Performed by: INTERNAL MEDICINE

## 2018-01-01 PROCEDURE — 0BH17EZ INSERTION OF ENDOTRACHEAL AIRWAY INTO TRACHEA, VIA NATURAL OR ARTIFICIAL OPENING: ICD-10-PCS | Performed by: HOSPITALIST

## 2018-01-01 PROCEDURE — 88342 IMHCHEM/IMCYTCHM 1ST ANTB: CPT | Performed by: HOSPITALIST

## 2018-01-01 PROCEDURE — 88172 CYTP DX EVAL FNA 1ST EA SITE: CPT | Performed by: HOSPITALIST

## 2018-01-01 PROCEDURE — 93308 TTE F-UP OR LMTD: CPT | Performed by: INTERNAL MEDICINE

## 2018-01-01 PROCEDURE — 70553 MRI BRAIN STEM W/O & W/DYE: CPT | Performed by: HOSPITALIST

## 2018-01-01 PROCEDURE — 84132 ASSAY OF SERUM POTASSIUM: CPT | Performed by: INTERNAL MEDICINE

## 2018-01-01 PROCEDURE — 71046 X-RAY EXAM CHEST 2 VIEWS: CPT | Performed by: EMERGENCY MEDICINE

## 2018-01-01 PROCEDURE — 88173 CYTOPATH EVAL FNA REPORT: CPT | Performed by: HOSPITALIST

## 2018-01-01 RX ORDER — IPRATROPIUM BROMIDE AND ALBUTEROL SULFATE 2.5; .5 MG/3ML; MG/3ML
3 SOLUTION RESPIRATORY (INHALATION)
Status: DISCONTINUED | OUTPATIENT
Start: 2018-01-01 | End: 2018-01-01

## 2018-01-01 RX ORDER — FLUCONAZOLE 200 MG/1
200 TABLET ORAL DAILY
Status: DISCONTINUED | OUTPATIENT
Start: 2018-01-01 | End: 2018-01-01 | Stop reason: ALTCHOICE

## 2018-01-01 RX ORDER — PREDNISONE 20 MG/1
40 TABLET ORAL
Status: DISCONTINUED | OUTPATIENT
Start: 2018-01-01 | End: 2018-01-01

## 2018-01-01 RX ORDER — HEPARIN SODIUM 5000 [USP'U]/ML
5000 INJECTION, SOLUTION INTRAVENOUS; SUBCUTANEOUS EVERY 8 HOURS SCHEDULED
Status: DISCONTINUED | OUTPATIENT
Start: 2018-01-01 | End: 2018-01-01

## 2018-01-01 RX ORDER — POLYETHYLENE GLYCOL 3350 17 G/17G
17 POWDER, FOR SOLUTION ORAL DAILY PRN
Status: DISCONTINUED | OUTPATIENT
Start: 2018-01-01 | End: 2018-01-01

## 2018-01-01 RX ORDER — SODIUM PHOSPHATE, DIBASIC AND SODIUM PHOSPHATE, MONOBASIC 7; 19 G/133ML; G/133ML
1 ENEMA RECTAL ONCE AS NEEDED
Status: DISCONTINUED | OUTPATIENT
Start: 2018-01-01 | End: 2018-01-01

## 2018-01-01 RX ORDER — HYDROMORPHONE HYDROCHLORIDE 1 MG/ML
0.8 INJECTION, SOLUTION INTRAMUSCULAR; INTRAVENOUS; SUBCUTANEOUS EVERY 2 HOUR PRN
Status: DISCONTINUED | OUTPATIENT
Start: 2018-01-01 | End: 2018-01-01

## 2018-01-01 RX ORDER — SODIUM CHLORIDE 9 MG/ML
INJECTION, SOLUTION INTRAVENOUS CONTINUOUS
Status: DISPENSED | OUTPATIENT
Start: 2018-01-01 | End: 2018-01-01

## 2018-01-01 RX ORDER — MORPHINE SULFATE 30 MG/1
30 TABLET ORAL EVERY 4 HOURS PRN
Qty: 60 TABLET | Refills: 0 | Status: ON HOLD | OUTPATIENT
Start: 2018-01-01 | End: 2018-01-01

## 2018-01-01 RX ORDER — ONDANSETRON 2 MG/ML
4 INJECTION INTRAMUSCULAR; INTRAVENOUS ONCE
Status: COMPLETED | OUTPATIENT
Start: 2018-01-01 | End: 2018-01-01

## 2018-01-01 RX ORDER — SODIUM CHLORIDE 0.9 % (FLUSH) 0.9 %
3 SYRINGE (ML) INJECTION AS NEEDED
Status: DISCONTINUED | OUTPATIENT
Start: 2018-01-01 | End: 2018-01-01

## 2018-01-01 RX ORDER — HYDROCODONE BITARTRATE AND ACETAMINOPHEN 10; 325 MG/1; MG/1
1 TABLET ORAL EVERY 4 HOURS PRN
Status: DISCONTINUED | OUTPATIENT
Start: 2018-01-01 | End: 2018-01-01

## 2018-01-01 RX ORDER — MORPHINE SULFATE 30 MG/1
30 TABLET, FILM COATED, EXTENDED RELEASE ORAL EVERY 12 HOURS SCHEDULED
Status: DISCONTINUED | OUTPATIENT
Start: 2018-01-01 | End: 2018-01-01

## 2018-01-01 RX ORDER — DOCUSATE SODIUM 100 MG/1
100 CAPSULE, LIQUID FILLED ORAL 2 TIMES DAILY
Status: DISCONTINUED | OUTPATIENT
Start: 2018-01-01 | End: 2018-01-01

## 2018-01-01 RX ORDER — MORPHINE SULFATE IN 0.9 % NACL 1 MG/ML
PLASTIC BAG, INJECTION (ML) INTRAVENOUS CONTINUOUS
Status: DISCONTINUED | OUTPATIENT
Start: 2018-01-01 | End: 2018-01-01

## 2018-01-01 RX ORDER — 0.9 % SODIUM CHLORIDE 0.9 %
VIAL (ML) INJECTION
Status: COMPLETED
Start: 2018-01-01 | End: 2018-01-01

## 2018-01-01 RX ORDER — SODIUM CHLORIDE 9 MG/ML
INJECTION, SOLUTION INTRAVENOUS ONCE
Status: DISCONTINUED | OUTPATIENT
Start: 2018-01-01 | End: 2018-01-01

## 2018-01-01 RX ORDER — HYDROCODONE POLISTIREX AND CHLORPHENIRAMINE POLISTIREX 10; 8 MG/5ML; MG/5ML
5 SUSPENSION, EXTENDED RELEASE ORAL 2 TIMES DAILY PRN
Status: DISCONTINUED | OUTPATIENT
Start: 2018-01-01 | End: 2018-01-01

## 2018-01-01 RX ORDER — MORPHINE SULFATE 4 MG/ML
2 INJECTION, SOLUTION INTRAMUSCULAR; INTRAVENOUS
Status: DISCONTINUED | OUTPATIENT
Start: 2018-01-01 | End: 2018-01-01

## 2018-01-01 RX ORDER — MORPHINE SULFATE 4 MG/ML
4 INJECTION, SOLUTION INTRAMUSCULAR; INTRAVENOUS ONCE
Status: COMPLETED | OUTPATIENT
Start: 2018-01-01 | End: 2018-01-01

## 2018-01-01 RX ORDER — SODIUM CHLORIDE 0.9 % (FLUSH) 0.9 %
10 SYRINGE (ML) INJECTION AS NEEDED
Status: DISCONTINUED | OUTPATIENT
Start: 2018-01-01 | End: 2018-01-01

## 2018-01-01 RX ORDER — METOCLOPRAMIDE HYDROCHLORIDE 5 MG/ML
10 INJECTION INTRAMUSCULAR; INTRAVENOUS EVERY 8 HOURS PRN
Status: DISCONTINUED | OUTPATIENT
Start: 2018-01-01 | End: 2018-01-01

## 2018-01-01 RX ORDER — SENNOSIDES 8.6 MG
8.6 TABLET ORAL 2 TIMES DAILY
Status: DISCONTINUED | OUTPATIENT
Start: 2018-01-01 | End: 2018-01-01

## 2018-01-01 RX ORDER — HYDROMORPHONE HYDROCHLORIDE 1 MG/ML
0.4 INJECTION, SOLUTION INTRAMUSCULAR; INTRAVENOUS; SUBCUTANEOUS EVERY 2 HOUR PRN
Status: DISCONTINUED | OUTPATIENT
Start: 2018-01-01 | End: 2018-01-01

## 2018-01-01 RX ORDER — SODIUM CHLORIDE 9 MG/ML
INJECTION, SOLUTION INTRAVENOUS
Status: DISPENSED
Start: 2018-01-01 | End: 2018-01-01

## 2018-01-01 RX ORDER — MORPHINE SULFATE 2 MG/ML
1 INJECTION, SOLUTION INTRAMUSCULAR; INTRAVENOUS EVERY 4 HOURS PRN
Status: DISCONTINUED | OUTPATIENT
Start: 2018-01-01 | End: 2018-01-01 | Stop reason: RX

## 2018-01-01 RX ORDER — GABAPENTIN 600 MG/1
600 TABLET ORAL 3 TIMES DAILY
Qty: 90 TABLET | Refills: 0 | Status: SHIPPED | OUTPATIENT
Start: 2018-01-01

## 2018-01-01 RX ORDER — FENTANYL 12 UG/H
1 PATCH TRANSDERMAL
Status: DISCONTINUED | OUTPATIENT
Start: 2018-01-01 | End: 2018-01-01

## 2018-01-01 RX ORDER — GABAPENTIN 300 MG/1
600 CAPSULE ORAL 3 TIMES DAILY
Status: DISCONTINUED | OUTPATIENT
Start: 2018-01-01 | End: 2018-01-01

## 2018-01-01 RX ORDER — MORPHINE SULFATE 4 MG/ML
INJECTION, SOLUTION INTRAMUSCULAR; INTRAVENOUS
Status: COMPLETED
Start: 2018-01-01 | End: 2018-01-01

## 2018-01-01 RX ORDER — SODIUM CHLORIDE 9 MG/ML
INJECTION, SOLUTION INTRAVENOUS CONTINUOUS
Status: DISCONTINUED | OUTPATIENT
Start: 2018-01-01 | End: 2018-01-01

## 2018-01-01 RX ORDER — MORPHINE SULFATE 20 MG/ML
20 SOLUTION ORAL
Status: DISCONTINUED | OUTPATIENT
Start: 2018-01-01 | End: 2018-01-01

## 2018-01-01 RX ORDER — SODIUM CHLORIDE 9 MG/ML
INJECTION, SOLUTION INTRAVENOUS
Status: COMPLETED
Start: 2018-01-01 | End: 2018-01-01

## 2018-01-01 RX ORDER — FUROSEMIDE 10 MG/ML
20 INJECTION INTRAMUSCULAR; INTRAVENOUS ONCE
Status: DISCONTINUED | OUTPATIENT
Start: 2018-01-01 | End: 2018-01-01

## 2018-01-01 RX ORDER — MORPHINE SULFATE 2 MG/ML
2 INJECTION, SOLUTION INTRAMUSCULAR; INTRAVENOUS
Status: DISCONTINUED | OUTPATIENT
Start: 2018-01-01 | End: 2018-01-01

## 2018-01-01 RX ORDER — FLUCONAZOLE 200 MG/1
200 TABLET ORAL DAILY
Qty: 28 TABLET | Refills: 0 | Status: ON HOLD | OUTPATIENT
Start: 2018-01-01 | End: 2018-01-01

## 2018-01-01 RX ORDER — POTASSIUM CHLORIDE 20 MEQ/1
40 TABLET, EXTENDED RELEASE ORAL ONCE
Status: COMPLETED | OUTPATIENT
Start: 2018-01-01 | End: 2018-01-01

## 2018-01-01 RX ORDER — ONDANSETRON 4 MG/1
4 TABLET, ORALLY DISINTEGRATING ORAL EVERY 6 HOURS PRN
Status: DISCONTINUED | OUTPATIENT
Start: 2018-01-01 | End: 2018-01-01

## 2018-01-01 RX ORDER — GLYCOPYRROLATE 0.2 MG/ML
0.8 INJECTION, SOLUTION INTRAMUSCULAR; INTRAVENOUS EVERY 6 HOURS PRN
Status: DISCONTINUED | OUTPATIENT
Start: 2018-01-01 | End: 2018-01-01

## 2018-01-01 RX ORDER — FENTANYL 12 UG/H
2 PATCH TRANSDERMAL
Status: DISCONTINUED | OUTPATIENT
Start: 2018-01-01 | End: 2018-01-01

## 2018-01-01 RX ORDER — PROCHLORPERAZINE MALEATE 5 MG/1
5 TABLET ORAL EVERY 6 HOURS PRN
Status: DISCONTINUED | OUTPATIENT
Start: 2018-01-01 | End: 2018-01-01

## 2018-01-01 RX ORDER — ACETAMINOPHEN 500 MG
TABLET ORAL
Status: COMPLETED
Start: 2018-01-01 | End: 2018-01-01

## 2018-01-01 RX ORDER — SUCRALFATE ORAL 1 G/10ML
1 SUSPENSION ORAL
Status: DISCONTINUED | OUTPATIENT
Start: 2018-01-01 | End: 2018-01-01

## 2018-01-01 RX ORDER — METHYLPREDNISOLONE SODIUM SUCCINATE 125 MG/2ML
125 INJECTION, POWDER, LYOPHILIZED, FOR SOLUTION INTRAMUSCULAR; INTRAVENOUS ONCE
Status: COMPLETED | OUTPATIENT
Start: 2018-01-01 | End: 2018-01-01

## 2018-01-01 RX ORDER — ACETAMINOPHEN 325 MG/1
650 TABLET ORAL EVERY 6 HOURS PRN
Status: DISCONTINUED | OUTPATIENT
Start: 2018-01-01 | End: 2018-01-01

## 2018-01-01 RX ORDER — ACETAMINOPHEN 650 MG/1
650 SUPPOSITORY RECTAL EVERY 6 HOURS PRN
Status: DISCONTINUED | OUTPATIENT
Start: 2018-01-01 | End: 2018-01-01

## 2018-01-01 RX ORDER — FUROSEMIDE 20 MG/1
20 TABLET ORAL DAILY
Status: DISCONTINUED | OUTPATIENT
Start: 2018-01-01 | End: 2018-01-01

## 2018-01-01 RX ORDER — MORPHINE SULFATE 30 MG/1
30 TABLET ORAL EVERY 4 HOURS PRN
Status: DISCONTINUED | OUTPATIENT
Start: 2018-01-01 | End: 2018-01-01

## 2018-01-01 RX ORDER — FUROSEMIDE 10 MG/ML
20 INJECTION INTRAMUSCULAR; INTRAVENOUS ONCE
Status: COMPLETED | OUTPATIENT
Start: 2018-01-01 | End: 2018-01-01

## 2018-01-01 RX ORDER — DEXAMETHASONE SODIUM PHOSPHATE 4 MG/ML
2 VIAL (ML) INJECTION 2 TIMES DAILY
Status: DISCONTINUED | OUTPATIENT
Start: 2018-01-01 | End: 2018-01-01

## 2018-01-01 RX ORDER — ENOXAPARIN SODIUM 100 MG/ML
1 INJECTION SUBCUTANEOUS EVERY 12 HOURS SCHEDULED
Status: DISCONTINUED | OUTPATIENT
Start: 2018-01-01 | End: 2018-01-01

## 2018-01-01 RX ORDER — ONDANSETRON 2 MG/ML
8 INJECTION INTRAMUSCULAR; INTRAVENOUS
Status: DISCONTINUED | OUTPATIENT
Start: 2018-01-01 | End: 2018-01-01

## 2018-01-01 RX ORDER — DILTIAZEM HYDROCHLORIDE 5 MG/ML
10 INJECTION INTRAVENOUS ONCE
Status: COMPLETED | OUTPATIENT
Start: 2018-01-01 | End: 2018-01-01

## 2018-01-01 RX ORDER — LIDOCAINE HYDROCHLORIDE 20 MG/ML
INJECTION, SOLUTION EPIDURAL; INFILTRATION; INTRACAUDAL; PERINEURAL
Status: COMPLETED
Start: 2018-01-01 | End: 2018-01-01

## 2018-01-01 RX ORDER — FENTANYL 25 UG/H
1 PATCH TRANSDERMAL
Status: DISCONTINUED | OUTPATIENT
Start: 2018-01-01 | End: 2018-01-01

## 2018-01-01 RX ORDER — FUROSEMIDE 10 MG/ML
40 INJECTION INTRAMUSCULAR; INTRAVENOUS ONCE
Status: COMPLETED | OUTPATIENT
Start: 2018-01-01 | End: 2018-01-01

## 2018-01-01 RX ORDER — ONDANSETRON 2 MG/ML
8 INJECTION INTRAMUSCULAR; INTRAVENOUS EVERY 6 HOURS PRN
Status: DISCONTINUED | OUTPATIENT
Start: 2018-01-01 | End: 2018-01-01

## 2018-01-01 RX ORDER — LIDOCAINE 50 MG/G
1 PATCH TOPICAL DAILY
Status: DISCONTINUED | OUTPATIENT
Start: 2018-01-01 | End: 2018-01-01

## 2018-01-01 RX ORDER — AMLODIPINE BESYLATE 5 MG/1
5 TABLET ORAL DAILY
Status: DISCONTINUED | OUTPATIENT
Start: 2018-01-01 | End: 2018-01-01

## 2018-01-01 RX ORDER — ONDANSETRON 2 MG/ML
4 INJECTION INTRAMUSCULAR; INTRAVENOUS EVERY 6 HOURS PRN
Status: DISCONTINUED | OUTPATIENT
Start: 2018-01-01 | End: 2018-01-01

## 2018-01-01 RX ORDER — MAGNESIUM OXIDE 400 MG (241.3 MG MAGNESIUM) TABLET
400 TABLET ONCE
Status: COMPLETED | OUTPATIENT
Start: 2018-01-01 | End: 2018-01-01

## 2018-01-01 RX ORDER — FENTANYL 25 UG/H
2 PATCH TRANSDERMAL
Status: DISCONTINUED | OUTPATIENT
Start: 2018-01-01 | End: 2018-01-01

## 2018-01-01 RX ORDER — METOPROLOL TARTRATE 5 MG/5ML
5 INJECTION INTRAVENOUS ONCE
Status: COMPLETED | OUTPATIENT
Start: 2018-01-01 | End: 2018-01-01

## 2018-01-01 RX ORDER — MORPHINE SULFATE 20 MG/ML
10 SOLUTION ORAL EVERY 4 HOURS PRN
Status: DISCONTINUED | OUTPATIENT
Start: 2018-01-01 | End: 2018-01-01

## 2018-01-01 RX ORDER — LIDOCAINE HYDROCHLORIDE 10 MG/ML
0.5 INJECTION, SOLUTION INFILTRATION; PERINEURAL ONCE AS NEEDED
Status: ACTIVE | OUTPATIENT
Start: 2018-01-01 | End: 2018-01-01

## 2018-01-01 RX ORDER — MORPHINE SULFATE IN 0.9 % NACL 1 MG/ML
4 PLASTIC BAG, INJECTION (ML) INTRAVENOUS CONTINUOUS
Status: DISCONTINUED | OUTPATIENT
Start: 2018-01-01 | End: 2018-01-01

## 2018-01-01 RX ORDER — POLYETHYLENE GLYCOL 3350 17 G/17G
17 POWDER, FOR SOLUTION ORAL DAILY PRN
Qty: 10 EACH | Refills: 0 | Status: ON HOLD | OUTPATIENT
Start: 2018-01-01 | End: 2018-01-01

## 2018-01-01 RX ORDER — MIDAZOLAM HYDROCHLORIDE 1 MG/ML
INJECTION INTRAMUSCULAR; INTRAVENOUS
Status: COMPLETED
Start: 2018-01-01 | End: 2018-01-01

## 2018-01-01 RX ORDER — ALBUMIN (HUMAN) 12.5 G/50ML
25 SOLUTION INTRAVENOUS ONCE
Status: COMPLETED | OUTPATIENT
Start: 2018-01-01 | End: 2018-01-01

## 2018-01-01 RX ORDER — SENNA PLUS 8.6 MG/1
8.6 TABLET ORAL 2 TIMES DAILY
Qty: 60 TABLET | Refills: 0 | Status: SHIPPED | OUTPATIENT
Start: 2018-01-01

## 2018-01-01 RX ORDER — FENTANYL 50 UG/H
1 PATCH TRANSDERMAL
Qty: 10 PATCH | Refills: 0 | Status: ON HOLD | OUTPATIENT
Start: 2018-01-01 | End: 2018-01-01

## 2018-01-01 RX ORDER — ACETAMINOPHEN 500 MG
500 TABLET ORAL EVERY 6 HOURS PRN
Status: DISCONTINUED | OUTPATIENT
Start: 2018-01-01 | End: 2018-01-01

## 2018-01-01 RX ORDER — SODIUM CHLORIDE 9 MG/ML
1000 INJECTION, SOLUTION INTRAVENOUS ONCE
Status: COMPLETED | OUTPATIENT
Start: 2018-01-01 | End: 2018-01-01

## 2018-01-01 RX ORDER — SCOLOPAMINE TRANSDERMAL SYSTEM 1 MG/1
1 PATCH, EXTENDED RELEASE TRANSDERMAL
Status: DISCONTINUED | OUTPATIENT
Start: 2018-01-01 | End: 2018-01-01

## 2018-01-01 RX ORDER — MORPHINE SULFATE 15 MG/1
15 TABLET, FILM COATED, EXTENDED RELEASE ORAL EVERY 12 HOURS SCHEDULED
Status: COMPLETED | OUTPATIENT
Start: 2018-01-01 | End: 2018-01-01

## 2018-01-01 RX ORDER — DOCUSATE SODIUM 100 MG/1
100 CAPSULE, LIQUID FILLED ORAL 2 TIMES DAILY
Status: DISCONTINUED | OUTPATIENT
Start: 2018-01-01 | End: 2018-01-01 | Stop reason: SDUPTHER

## 2018-01-01 RX ORDER — MORPHINE SULFATE 60 MG/1
60 TABLET, FILM COATED, EXTENDED RELEASE ORAL EVERY 12 HOURS SCHEDULED
Status: DISCONTINUED | OUTPATIENT
Start: 2018-01-01 | End: 2018-01-01

## 2018-01-01 RX ORDER — HYDRALAZINE HYDROCHLORIDE 25 MG/1
25 TABLET, FILM COATED ORAL ONCE
Status: COMPLETED | OUTPATIENT
Start: 2018-01-01 | End: 2018-01-01

## 2018-01-01 RX ORDER — KETOROLAC TROMETHAMINE 15 MG/ML
15 INJECTION, SOLUTION INTRAMUSCULAR; INTRAVENOUS EVERY 6 HOURS PRN
Status: DISCONTINUED | OUTPATIENT
Start: 2018-01-01 | End: 2018-01-01

## 2018-01-01 RX ORDER — MORPHINE SULFATE 15 MG/1
15 TABLET, FILM COATED, EXTENDED RELEASE ORAL EVERY 8 HOURS SCHEDULED
Qty: 90 TABLET | Refills: 0 | Status: ON HOLD | OUTPATIENT
Start: 2018-01-01 | End: 2018-01-01

## 2018-01-01 RX ORDER — MORPHINE SULFATE 30 MG/1
30 TABLET ORAL EVERY 4 HOURS PRN
Qty: 40 TABLET | Refills: 0 | Status: SHIPPED | OUTPATIENT
Start: 2018-01-01 | End: 2018-01-01

## 2018-01-01 RX ORDER — METHYLPREDNISOLONE SODIUM SUCCINATE 125 MG/2ML
60 INJECTION, POWDER, LYOPHILIZED, FOR SOLUTION INTRAMUSCULAR; INTRAVENOUS EVERY 8 HOURS
Status: DISCONTINUED | OUTPATIENT
Start: 2018-01-01 | End: 2018-01-01

## 2018-01-01 RX ORDER — MORPHINE SULFATE 2 MG/ML
1 INJECTION, SOLUTION INTRAMUSCULAR; INTRAVENOUS EVERY 2 HOUR PRN
Status: DISCONTINUED | OUTPATIENT
Start: 2018-01-01 | End: 2018-01-01

## 2018-01-01 RX ORDER — CODEINE PHOSPHATE AND GUAIFENESIN 10; 100 MG/5ML; MG/5ML
5 SOLUTION ORAL EVERY 4 HOURS PRN
Status: DISCONTINUED | OUTPATIENT
Start: 2018-01-01 | End: 2018-01-01

## 2018-01-01 RX ORDER — METOCLOPRAMIDE HYDROCHLORIDE 5 MG/ML
10 INJECTION INTRAMUSCULAR; INTRAVENOUS EVERY 6 HOURS PRN
Status: DISCONTINUED | OUTPATIENT
Start: 2018-01-01 | End: 2018-01-01

## 2018-01-01 RX ORDER — POTASSIUM CHLORIDE 1.5 G/1.77G
20 POWDER, FOR SOLUTION ORAL DAILY
Qty: 30 PACKET | Refills: 0 | Status: SHIPPED | OUTPATIENT
Start: 2018-01-01

## 2018-01-01 RX ORDER — MORPHINE SULFATE 4 MG/ML
1 INJECTION, SOLUTION INTRAMUSCULAR; INTRAVENOUS EVERY 4 HOURS PRN
Status: DISCONTINUED | OUTPATIENT
Start: 2018-01-01 | End: 2018-01-01

## 2018-01-01 RX ORDER — FLUCONAZOLE 2 MG/ML
200 INJECTION, SOLUTION INTRAVENOUS EVERY 24 HOURS
Status: DISCONTINUED | OUTPATIENT
Start: 2018-01-01 | End: 2018-01-01

## 2018-01-01 RX ORDER — PROCHLORPERAZINE MALEATE 10 MG
10 TABLET ORAL EVERY 6 HOURS PRN
Status: DISCONTINUED | OUTPATIENT
Start: 2018-01-01 | End: 2018-01-01

## 2018-01-01 RX ORDER — ACETAMINOPHEN 500 MG
1000 TABLET ORAL ONCE
Status: COMPLETED | OUTPATIENT
Start: 2018-01-01 | End: 2018-01-01

## 2018-01-01 RX ORDER — DIAZEPAM 5 MG/ML
2.5 INJECTION, SOLUTION INTRAMUSCULAR; INTRAVENOUS NIGHTLY PRN
Status: DISCONTINUED | OUTPATIENT
Start: 2018-01-01 | End: 2018-01-01

## 2018-01-01 RX ORDER — FUROSEMIDE 10 MG/ML
40 INJECTION INTRAMUSCULAR; INTRAVENOUS
Status: DISCONTINUED | OUTPATIENT
Start: 2018-01-01 | End: 2018-01-01

## 2018-01-01 RX ORDER — HYDROMORPHONE HYDROCHLORIDE 1 MG/ML
0.2 INJECTION, SOLUTION INTRAMUSCULAR; INTRAVENOUS; SUBCUTANEOUS EVERY 2 HOUR PRN
Status: DISCONTINUED | OUTPATIENT
Start: 2018-01-01 | End: 2018-01-01

## 2018-01-01 RX ORDER — MORPHINE SULFATE 4 MG/ML
4 INJECTION, SOLUTION INTRAMUSCULAR; INTRAVENOUS EVERY 2 HOUR PRN
Status: DISCONTINUED | OUTPATIENT
Start: 2018-01-01 | End: 2018-01-01

## 2018-01-01 RX ORDER — METOPROLOL TARTRATE 50 MG/1
50 TABLET, FILM COATED ORAL
Status: DISCONTINUED | OUTPATIENT
Start: 2018-01-01 | End: 2018-01-01

## 2018-01-01 RX ORDER — ONDANSETRON 2 MG/ML
4 INJECTION INTRAMUSCULAR; INTRAVENOUS EVERY 4 HOURS PRN
Status: DISCONTINUED | OUTPATIENT
Start: 2018-01-01 | End: 2018-01-01

## 2018-01-01 RX ORDER — POTASSIUM CHLORIDE 20 MEQ/1
40 TABLET, EXTENDED RELEASE ORAL EVERY 4 HOURS
Status: DISCONTINUED | OUTPATIENT
Start: 2018-01-01 | End: 2018-01-01

## 2018-01-01 RX ORDER — PSEUDOEPHEDRINE HCL 30 MG
100 TABLET ORAL 2 TIMES DAILY
Qty: 60 CAPSULE | Refills: 0 | Status: SHIPPED | OUTPATIENT
Start: 2018-01-01

## 2018-01-01 RX ORDER — MORPHINE SULFATE 4 MG/ML
2 INJECTION, SOLUTION INTRAMUSCULAR; INTRAVENOUS ONCE
Status: COMPLETED | OUTPATIENT
Start: 2018-01-01 | End: 2018-01-01

## 2018-01-01 RX ORDER — DEXTROSE AND SODIUM CHLORIDE 5; .45 G/100ML; G/100ML
INJECTION, SOLUTION INTRAVENOUS CONTINUOUS
Status: DISPENSED | OUTPATIENT
Start: 2018-01-01 | End: 2018-01-01

## 2018-01-01 RX ORDER — ACETAMINOPHEN 160 MG/5ML
650 SOLUTION ORAL EVERY 6 HOURS PRN
Status: DISCONTINUED | OUTPATIENT
Start: 2018-01-01 | End: 2018-01-01

## 2018-01-01 RX ORDER — DEXAMETHASONE 2 MG/1
TABLET ORAL
Qty: 9 TABLET | Refills: 0 | Status: ON HOLD | OUTPATIENT
Start: 2018-01-01 | End: 2018-01-01

## 2018-01-01 RX ORDER — IPRATROPIUM BROMIDE AND ALBUTEROL SULFATE 2.5; .5 MG/3ML; MG/3ML
3 SOLUTION RESPIRATORY (INHALATION) EVERY 4 HOURS
Status: DISCONTINUED | OUTPATIENT
Start: 2018-01-01 | End: 2018-01-01

## 2018-01-01 RX ORDER — FENTANYL 50 UG/H
1 PATCH TRANSDERMAL
Status: DISCONTINUED | OUTPATIENT
Start: 2018-01-01 | End: 2018-01-01

## 2018-01-01 RX ORDER — PREDNISONE 10 MG/1
TABLET ORAL
Qty: 9 TABLET | Refills: 0 | Status: SHIPPED | OUTPATIENT
Start: 2018-01-01

## 2018-01-01 RX ORDER — HYDROMORPHONE HYDROCHLORIDE 1 MG/ML
1 INJECTION, SOLUTION INTRAMUSCULAR; INTRAVENOUS; SUBCUTANEOUS ONCE
Status: COMPLETED | OUTPATIENT
Start: 2018-01-01 | End: 2018-01-01

## 2018-01-01 RX ORDER — DEXAMETHASONE SODIUM PHOSPHATE 4 MG/ML
2 VIAL (ML) INJECTION 3 TIMES DAILY
Status: DISCONTINUED | OUTPATIENT
Start: 2018-01-01 | End: 2018-01-01

## 2018-01-01 RX ORDER — LORAZEPAM 2 MG/ML
0.5 INJECTION INTRAMUSCULAR EVERY 6 HOURS PRN
Status: DISCONTINUED | OUTPATIENT
Start: 2018-01-01 | End: 2018-01-01

## 2018-01-01 RX ORDER — MORPHINE SULFATE 20 MG/ML
30 SOLUTION ORAL
Status: DISCONTINUED | OUTPATIENT
Start: 2018-01-01 | End: 2018-01-01

## 2018-01-01 RX ORDER — BISACODYL 10 MG
10 SUPPOSITORY, RECTAL RECTAL
Status: DISCONTINUED | OUTPATIENT
Start: 2018-01-01 | End: 2018-01-01

## 2018-01-01 RX ORDER — IPRATROPIUM BROMIDE AND ALBUTEROL SULFATE 2.5; .5 MG/3ML; MG/3ML
3 SOLUTION RESPIRATORY (INHALATION) EVERY 4 HOURS PRN
Status: DISCONTINUED | OUTPATIENT
Start: 2018-01-01 | End: 2018-01-01

## 2018-01-01 RX ORDER — LORAZEPAM 2 MG/ML
2 INJECTION INTRAMUSCULAR EVERY 10 MIN PRN
Status: DISCONTINUED | OUTPATIENT
Start: 2018-01-01 | End: 2018-01-01

## 2018-01-01 RX ORDER — MORPHINE SULFATE 4 MG/ML
8 INJECTION, SOLUTION INTRAMUSCULAR; INTRAVENOUS ONCE
Status: COMPLETED | OUTPATIENT
Start: 2018-01-01 | End: 2018-01-01

## 2018-01-01 RX ORDER — FUROSEMIDE 10 MG/ML
20 INJECTION INTRAMUSCULAR; INTRAVENOUS
Status: DISCONTINUED | OUTPATIENT
Start: 2018-01-01 | End: 2018-01-01

## 2018-01-01 RX ORDER — HYDRALAZINE HYDROCHLORIDE 25 MG/1
25 TABLET, FILM COATED ORAL EVERY 8 HOURS PRN
Status: DISCONTINUED | OUTPATIENT
Start: 2018-01-01 | End: 2018-01-01

## 2018-01-01 RX ORDER — POTASSIUM CHLORIDE 1.5 G/1.77G
20 POWDER, FOR SOLUTION ORAL DAILY
Status: DISCONTINUED | OUTPATIENT
Start: 2018-01-01 | End: 2018-01-01

## 2018-01-01 RX ORDER — PREDNISONE 20 MG/1
TABLET ORAL
Qty: 20 TABLET | Refills: 0 | Status: ON HOLD | OUTPATIENT
Start: 2018-01-01 | End: 2018-01-01

## 2018-01-01 RX ORDER — ACETAMINOPHEN 325 MG/1
650 TABLET ORAL EVERY 6 HOURS PRN
Qty: 30 TABLET | Refills: 0 | Status: ON HOLD | OUTPATIENT
Start: 2018-01-01 | End: 2018-01-01

## 2018-01-01 RX ORDER — MORPHINE SULFATE 15 MG/1
30 TABLET ORAL
Status: DISCONTINUED | OUTPATIENT
Start: 2018-01-01 | End: 2018-01-01

## 2018-01-01 RX ORDER — FOLIC ACID 1 MG/1
1 TABLET ORAL DAILY
Status: DISCONTINUED | OUTPATIENT
Start: 2018-01-01 | End: 2018-01-01

## 2018-01-01 RX ORDER — GABAPENTIN 300 MG/1
300 CAPSULE ORAL 3 TIMES DAILY
Status: DISCONTINUED | OUTPATIENT
Start: 2018-01-01 | End: 2018-01-01

## 2018-01-01 RX ORDER — MORPHINE SULFATE 15 MG/1
15 TABLET ORAL EVERY 2 HOUR PRN
Status: DISCONTINUED | OUTPATIENT
Start: 2018-01-01 | End: 2018-01-01

## 2018-01-01 RX ORDER — IPRATROPIUM BROMIDE AND ALBUTEROL SULFATE 2.5; .5 MG/3ML; MG/3ML
3 SOLUTION RESPIRATORY (INHALATION) EVERY 4 HOURS PRN
Qty: 90 VIAL | Refills: 1 | Status: SHIPPED | OUTPATIENT
Start: 2018-01-01

## 2018-01-01 RX ORDER — MORPHINE SULFATE 2 MG/ML
2 INJECTION, SOLUTION INTRAMUSCULAR; INTRAVENOUS EVERY 2 HOUR PRN
Status: DISCONTINUED | OUTPATIENT
Start: 2018-01-01 | End: 2018-01-01

## 2018-01-01 RX ORDER — IPRATROPIUM BROMIDE AND ALBUTEROL SULFATE 2.5; .5 MG/3ML; MG/3ML
3 SOLUTION RESPIRATORY (INHALATION) ONCE
Status: COMPLETED | OUTPATIENT
Start: 2018-01-01 | End: 2018-01-01

## 2018-01-01 RX ORDER — DIPHENHYDRAMINE HYDROCHLORIDE 50 MG/ML
25 INJECTION INTRAMUSCULAR; INTRAVENOUS EVERY 4 HOURS PRN
Status: DISCONTINUED | OUTPATIENT
Start: 2018-01-01 | End: 2018-01-01

## 2018-01-01 RX ORDER — BENZONATATE 100 MG/1
100 CAPSULE ORAL 3 TIMES DAILY PRN
Status: DISCONTINUED | OUTPATIENT
Start: 2018-01-01 | End: 2018-01-01

## 2018-01-01 RX ORDER — AMLODIPINE BESYLATE 5 MG/1
5 TABLET ORAL DAILY
Qty: 30 TABLET | Refills: 0 | Status: ON HOLD | OUTPATIENT
Start: 2018-01-01 | End: 2018-01-01

## 2018-01-01 RX ORDER — MORPHINE SULFATE 15 MG/1
15 TABLET ORAL EVERY 4 HOURS PRN
Status: DISCONTINUED | OUTPATIENT
Start: 2018-01-01 | End: 2018-01-01

## 2018-01-01 RX ORDER — PANTOPRAZOLE SODIUM 40 MG/1
40 TABLET, DELAYED RELEASE ORAL
Status: DISCONTINUED | OUTPATIENT
Start: 2018-01-01 | End: 2018-01-01

## 2018-01-01 RX ORDER — FUROSEMIDE 10 MG/ML
INJECTION INTRAMUSCULAR; INTRAVENOUS
Status: COMPLETED
Start: 2018-01-01 | End: 2018-01-01

## 2018-01-01 RX ORDER — MORPHINE SULFATE 15 MG/1
30 TABLET ORAL EVERY 4 HOURS PRN
Status: DISCONTINUED | OUTPATIENT
Start: 2018-01-01 | End: 2018-01-01

## 2018-01-01 RX ORDER — MORPHINE SULFATE 15 MG/1
15 TABLET, FILM COATED, EXTENDED RELEASE ORAL EVERY 8 HOURS SCHEDULED
Status: DISCONTINUED | OUTPATIENT
Start: 2018-01-01 | End: 2018-01-01

## 2018-01-01 RX ORDER — CYCLOBENZAPRINE HCL 10 MG
10 TABLET ORAL 3 TIMES DAILY PRN
Status: DISCONTINUED | OUTPATIENT
Start: 2018-01-01 | End: 2018-01-01

## 2018-01-01 RX ORDER — PREDNISONE 20 MG/1
20 TABLET ORAL
Status: DISCONTINUED | OUTPATIENT
Start: 2018-01-01 | End: 2018-01-01

## 2018-01-11 PROBLEM — E87.6 HYPOKALEMIA: Status: ACTIVE | Noted: 2018-01-01

## 2018-01-11 PROBLEM — R63.4 WEIGHT LOSS: Status: ACTIVE | Noted: 2018-01-01

## 2018-01-11 PROBLEM — N17.9 ACUTE KIDNEY INJURY (HCC): Status: ACTIVE | Noted: 2018-01-01

## 2018-01-11 PROBLEM — R07.9 RIGHT-SIDED CHEST PAIN: Status: ACTIVE | Noted: 2018-01-01

## 2018-02-06 PROBLEM — K20.90 ESOPHAGITIS: Status: ACTIVE | Noted: 2018-01-01

## 2018-02-16 PROBLEM — R11.2 INTRACTABLE VOMITING WITH NAUSEA, UNSPECIFIED VOMITING TYPE: Status: ACTIVE | Noted: 2018-01-01

## 2018-02-16 PROBLEM — R52 INTRACTABLE PAIN: Status: ACTIVE | Noted: 2018-01-01

## 2018-02-16 PROBLEM — E86.0 DEHYDRATION: Status: ACTIVE | Noted: 2018-01-01

## 2018-02-16 PROBLEM — D64.9 ANEMIA: Status: ACTIVE | Noted: 2018-01-01

## 2018-02-16 NOTE — ED INITIAL ASSESSMENT (HPI)
Pt c/o n/v and weakness. Pt sent from [facility] infusion center is [facility] for stated complaints. Pt was at infusion center d/t dehydration and low potassium.

## 2018-02-17 NOTE — PLAN OF CARE
Problem: RESPIRATORY - ADULT  Goal: Achieves optimal ventilation and oxygenation  INTERVENTIONS:  - Assess for changes in respiratory status  - Assess for changes in mentation and behavior  - Position to facilitate oxygenation and minimize respiratory effo analgesics based on type and severity of pain and evaluate response  - Implement non-pharmacological measures as appropriate and evaluate response  - Consider cultural and social influences on pain and pain management  - Manage/alleviate anxiety  - Utilize

## 2018-02-17 NOTE — PLAN OF CARE
Problem: RESPIRATORY - ADULT  Goal: Achieves optimal ventilation and oxygenation  INTERVENTIONS:  - Assess for changes in respiratory status  - Assess for changes in mentation and behavior  - Position to facilitate oxygenation and minimize respiratory effo Assess pain using appropriate pain scale  - Administer analgesics based on type and severity of pain and evaluate response  - Implement non-pharmacological measures as appropriate and evaluate response  - Consider cultural and social influences on pain and

## 2018-02-17 NOTE — H&P
[facility] Hospitalist H&P     CC: Patient presents with:  Dehydration (metabolic/constitutional)       PCP: Laya Harley MD      Assessment and Plan     Jarret Lung is a 52year old male with PMH sig for stage IIIB (T2N2MX) adenocarcinoma of the right up He has had 2 months of chest pain and recently severe throat/chest pain and was diagnosed with severe esophagitis/gastritis about 1 week ago and started on fluconazole without relief. He notes symptoms seem to get worse after chemotherapy.       He states 3   HYDROcodone-acetaminophen  MG Oral Tab Take 1 tablet by mouth every 4 to 6 hours as needed for Pain. Disp: 100 tablet Rfl: 0   dexamethasone (DECADRON) 4 MG tablet Take 2 tabs in AM day after chemo.  Then take 2 tabs in AM and PM for next 2 days, 02/17/18   0613   WBC  2.30*  2.9*  1.7*   HGB  8.9*  9.5*  8.4*   MCV  88.8  88.5  89.0   PLT  218  262  207       Recent Labs   Lab  02/15/18   1342  02/16/18   1740  02/17/18   0613   NA  136  136  137   K  3.1*  3.0*  3.3   CL  99*  96  103   CO2  29.2

## 2018-02-17 NOTE — ED PROVIDER NOTES
Patient Seen in: [facility]    History   Patient presents with:  Dehydration (metabolic/constitutional)    Stated Complaint: R Sided Chest Pain    HPI    The patient is a 26-year-old male with lung cancer status post patient and 5 out of 6 chemo Pain  Other systems are as noted in HPI. Constitutional and vital signs reviewed. All other systems reviewed and negative except as noted above.     Physical Exam   ED Triage Vitals [02/16/18 1745]  BP: 138/92  Pulse: 98  Resp: 16  Temp: 98.1 °F (36.7 RDW 17.0 (*)     Neutrophil Absolute 0.8 (*)     All other components within normal limits   CBC WITH DIFFERENTIAL WITH PLATELET    Narrative: The following orders were created for panel order CBC WITH DIFFERENTIAL WITH PLATELET.   Procedure Reviewed: 2/4/2018          ICD-10-CM Noted POA    * (Principal)Intractable pain R52 2/16/2018 Unknown    Anemia D64.9 2/16/2018 Yes    Dehydration E86.0 2/16/2018     Hypokalemia E87.6 1/11/2018     Intractable vomiting with nausea, unspecified vomiting t

## 2018-02-17 NOTE — PAYOR COMM NOTE
--------------  ADMISSION REVIEW     Payor: Thanh Boyer  #:  [id number]  Authorization Number: N/A    Admit date: 2/16/18  Admit time: 2115       Admitting Physician: Trish De La Paz MD  Attending Physician:  Fabian Hahn MD earliest 12       Past Surgical History:  No date: CYTOLOGY FINE NEEDLE Bilateral      Comment: 4L, 4R LN  2010: OTHER SURGICAL HISTORY      Comment: Left hand laceration and tendon repair    Family history reviewed and is not pertinent to presenting pro noted.   Psychiatric: He has a normal mood and affect. Judgment normal.   Nursing note and vitals reviewed.     Differential diagnosis includes pain from cancer and/or radiation, nausea secondary to chemo, gastritis, cholecystitis, esophagitis, PE, pneumoni and pain is much improved. There is no tachycardia or hypoxia. No fevers or chills.   Case discussed with [address] [facility] [address] hospitalist and patient will be admitted    Admission disposition: 2/16/2018  8:19 PM           Disposition and Plan     Clinical Impr Given 1 mg Intravenous Maria Del Carmen Castaneda, MARI      HYDROmorphone HCl (DILAUDID) 1 MG/ML injection 1 mg     Date Action Dose Route User    2/16/2018 1939 Given 1 mg Intravenous Radha Da Silva, RN      Morphine Sulfate (Concentrate) concentrated monserrat Intravenous Brain MARI Trotter      sucralfate (CARAFATE) 1 GM/10ML suspension 1 g     Date Action Dose Route User    2/17/2018 1206 Given 1 g Oral Monse Reyes RN    2/17/2018 3721 Given 1 g Oral michel Sher RN          REVIEWER COMMENTS:

## 2018-02-18 NOTE — PROGRESS NOTES
[facility] Hospitalist Progress Note     PCP: Mallorie Parr MD    CC: Follow up       Assessment/Plan:     Cherie Jang is a 52year old male with PMH sig for 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 s/p concurrent chemo/XRT who presents w relying on IV. D/w pt he does not need to swallow the oral morphine, can just let absorb in his mouth. Initially did not want to increase fentanyl patch but not taking the po meds with RN so will increase to try and get better baseline pain control. 2. 9*  1.7*  2.0*   HGB  9.5*  8.4*  8.4*   MCV  88.5  89.0  89.8   PLT  262  207  245       Recent Labs   Lab  02/16/18   1740  02/17/18   0613  02/18/18   0505   NA  136  137  136   K  3.0*  3.3  3.0*  3.0*   CL  96  103  106   CO2  25  26  24   BUN  7*

## 2018-02-18 NOTE — PLAN OF CARE
GASTROINTESTINAL - ADULT    • Minimal or absence of nausea and vomiting Not Progressing        PAIN - ADULT    • Verbalizes/displays adequate comfort level or patient's stated pain goal Not Progressing    Still with considerable amount of chest pain, diffi

## 2018-02-18 NOTE — CONSULTS
Hematology/Oncology Consult Note        NAME: Stephen Holt - ROOM: [facility]345-P - MRN: [id number] - Age: 52year old - : 1970    Reason for Consult:  Lung Cancer/ chest pain/ poor po intake     Patient is 52 y.o male with history of stage 3B lung c Cigarettes    Quit date: 5/1/2011    Smokeless tobacco: Never Used    Alcohol use Yes  2.4 oz/week    4 Cans of beer per week          Medications:  • fentaNYL  1 patch Transdermal Q72H   • [START ON 2/20/2018] fentaNYL  1 patch Transdermal Q72H   • gabape 103*   BUN  10  7*  5*   CREATSERUM  1.37*  1.30  1.05   GFRAA   --   >60  >60   GFRNAA   --   >60  >60   CA   --   9.4  8.8   ALB  2.9*   --    --    NA  136  136  137   K  3.1*  3.0*  3.3   CL  99*  96  103   CO2  29.2  25  26   ALKPHO  211*   --    --

## 2018-02-18 NOTE — PLAN OF CARE
METABOLIC/FLUID AND ELECTROLYTES - ADULT    • Electrolytes maintained within normal limits Not Progressing          GASTROINTESTINAL - ADULT    • Minimal or absence of nausea and vomiting Progressing        PAIN - ADULT    • Verbalizes/displays adequate co

## 2018-02-19 NOTE — PROGRESS NOTES
[facility] Hospitalist Progress Note     PCP: Carolin Sacks, MD    CC: Follow up       Assessment/Plan:     Arlen Monsalve is a 52year old male with PMH sig for 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 s/p concurrent chemo/XRT who presents w works for general pain but not with eating    Objective     OBJECTIVE:  Temp:  [97.3 °F (36.3 °C)-98.3 °F (36.8 °C)] 98.3 °F (36.8 °C)  Pulse:  [81-99] 83  Resp:  [18-20] 18  BP: (155-168)/(105-118) 155/118    Intake/Output:    Intake/Output Summary (Last Recent Labs   Lab  02/16/18   1740  02/17/18   0613  02/18/18   0505  02/18/18   1409  02/19/18   0459   NA  136  137  136   --   134*   K  3.0*  3.3  3.0*  3.0*  3.4  3.9   CL  96  103  106   --   102   CO2  25  26  24   --   24   BUN  7*  5*  6*

## 2018-02-19 NOTE — PLAN OF CARE
GASTROINTESTINAL - ADULT    • Minimal or absence of nausea and vomiting Not Progressing        PAIN - ADULT    • Verbalizes/displays adequate comfort level or patient's stated pain goal Not Progressing          METABOLIC/FLUID AND ELECTROLYTES - ADULT    •

## 2018-02-19 NOTE — PROGRESS NOTES
Hematology/Oncology  Progress Note        NAME: Russell Teague - ROOM: 024/171-W - MRN: [id number] - Age: 52year old - : 1970    Subjective:  No acute events overnight. Still having trouble keeping foods down.      Medications:  • fentaNYL  1 pa lesions.     Recent Labs   Lab  02/19/18   0459   RBC  2.96*   HGB  9.2*   HCT  26.7*   MCV  90.3   MCH  31.1   MCHC  34.4   RDW  16.6*   WBC  2.7*   PLT  319     Recent Labs   Lab  02/17/18   0613  02/18/18   0505  02/18/18   1409  02/19/18   0459   GLU  1

## 2018-02-19 NOTE — DIETARY NOTE
NUTRITION - INITIAL ASSESSMENT    Pt is at high nutrition risk. Pt meets malnutrition criteria.       CRITERIA FOR MALNUTRITION DIAGNOSIS:  Criteria for severe malnutrition diagnosis: acute illness/injury related to wt loss greater than 7.5% in 3 rene Discharge and transfer of nutrition care to new setting or provider: monitor plans    ADMITTING DIAGNOSIS:   Dehydration [E86.0]  Hypokalemia [E87.6]  Intractable pain [R52]  Malignant neoplasm of upper lobe of right lung (HCC) [C34.11]  Intractable vomiti Phosphate  2 mg Intravenous TID   • gabapentin  600 mg Oral TID   • fluconazole  200 mg Intravenous Q24H   • docusate sodium  100 mg Oral BID   • apixaban  5 mg Oral BID   • folic acid  1 mg Oral Daily   • sucralfate  1 g Oral TID AC and HS   dexamethasone 75% of needs and return to normal GI function      DIETITIAN FOLLOW UP: RD to follow up within 5 days    [address], [address] [facility] Dietitian [id number]

## 2018-02-19 NOTE — PAYOR COMM NOTE
--------------  ADMISSION REVIEW     Payor: Thanh Boyer  #:  [id number]  Authorization Number: [id number]     Admit date: 2/16/18  Admit time: 2115         Patient Seen in: [facility]    History   [facility]: Normocephalic and atraumatic. Mouth/Throat: Oropharynx is clear and moist.   Eyes: Conjunctivae and EOM are normal. Pupils are equal, round, and reactive to light. Neck: Normal range of motion. Neck supple. No JVD present.    Cardiovascular: Nor months of chest pain and recently severe throat/chest pain and was diagnosed with severe esophagitis/gastritis about 1 week ago and started on fluconazole without relief. He notes symptoms seem to get worse after chemotherapy.       Intractable throat/ches Lung cancer ([facility] [address] 10/2017   • Tattoo of skin     earliest 12        [address]  Past Surgical History:  No date: CYTOLOGY FINE NEEDLE Bilateral      Comment: 4L, 4R LN  2010: OTHER SURGICAL HISTORY      Comment: Left hand laceration and tendon repair     ALL: Objective     Temp: 98.3 °F (36.8 °C)  Pulse: 101  Resp: 16  BP: 154/104    Exam  Gen: moderate acute distress, alert and oriented x3  Neck Supple, no JVD  Pulm: Lungs clear, normal respiratory effort, No wheezing or crackles  CV: Heart with regula

## 2018-02-20 NOTE — PROGRESS NOTES
.sb  Hematology/Oncology  Progress Note        [facility] - ROOM: 807/453-D - MRN: [id number] - Age: 52year old - : 1970    Subjective:  No acute events overnight. Still having trouble keeping foods down.    Working with nutritionRosy mucosa, and tongue normal.  No thrush noted. Lungs: clear to ascultation bilaterally    Heart: Regular rate and rhythm, normal S1S2, no murmur. Abdomen: soft, non-tender, non-distended, positive BS. Extremity: no edema   Skin: No rashes or lesions.

## 2018-02-20 NOTE — PAYOR COMM NOTE
--------------  ADMISSION REVIEW     Payor: Thanh Merlin  #:  [id number]  Authorization Number: [id number]     Admit date: 2/16/18  Admit time: 2115       Patient Seen in: [facility]    History   Patient presents with:  Dehydration ( Potassium 3.0 (*)     BUN 7 (*)     BUN/CREA Ratio 5.4 (*)     All other components within normal limits   CBC W/ DIFFERENTIAL - Abnormal; Notable for the following:     WBC 2.9 (*)     RBC 3.06 (*)     HGB 9.5 (*)     HCT 27.1 (*)     RDW 17.0 (*) concurrent chemo/XRT who presents with intractable throat/chest pain. He has had 2 months of chest pain and recently severe throat/chest pain and was diagnosed with severe esophagitis/gastritis about 1 week ago and started on fluconazole without relief. daily.  ) Disp: 60 tablet Rfl: 3   HYDROcodone-acetaminophen  MG Oral Tab Take 1 tablet by mouth every 4 to 6 hours as needed for Pain. Disp: 100 tablet Rfl: 0   dexamethasone (DECADRON) 4 MG tablet Take 2 tabs in AM day after chemo.  Then take 2 tabs Labs   Lab  02/15/18   1342   ALT  121*   AST  72*   ALB  2.9*           2/18/18   Assessment/Plan:      Puma Méndez is a 52year old male with PMH sig for 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 s/p concurrent chemo/XRT who presents [90-94] 90  Resp:  [18-20] 20  BP: (148-155)/() 151/106     Exam  Gen: moderate acute distress, alert and oriented x3  Neck Supple, no JVD  Pulm: Lungs clear, normal respiratory effort, No wheezing or crackles  CV: Heart with regular rhythm, mildly t folic acid  1 mg Oral Daily   • sucralfate  1 g Oral TID AC and HS      • sodium chloride 100 mL/hr at 02/19/18 0929         Physical Exam:  BP (!) 168/109 (BP Location: Left arm)   Pulse 81   Temp 97.7 °F (36.5 °C) (Oral)   Resp 20   Ht 2.007 m (6' 7\") inpatient           2/20/18  Subjective:  No acute events overnight. Still having trouble keeping foods down.    Working with nutrition, magic cup and ensure unsuccessful, will try cream of wheat today      Medications:  • potassium chloride 40mEq IVPB (p

## 2018-02-20 NOTE — DIETARY NOTE
Brief Nutrition Note:    Full assessment documented 2/19--refer for details. Visit this am--poor tolerance to magic cup. Did not try ensure enlive.   Pt ordered cream of wheat with brown sugar and peaches this am for breakfast.  Urged increased po intake

## 2018-02-20 NOTE — PROGRESS NOTES
[facility] Hospitalist Progress Note     PCP: Stephen Jiménez MD    CC: Follow up       Assessment/Plan:     Armand Tom is a 52year old male with PMH sig for 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 s/p concurrent chemo/XRT who presents w of wheat down this today!     Objective     OBJECTIVE:  Temp:  [97.4 °F (36.3 °C)-98.1 °F (36.7 °C)] 97.4 °F (36.3 °C)  Pulse:  [] 113  Resp:  [20] 20  BP: (145-169)/(101-113) 147/108    Intake/Output:    Intake/Output Summary (Last 24 hours) at 02/20 NA  136   --   134*  132*   K  3.0*  3.0*  3.4  3.9  3.5   CL  106   --   102  97   CO2  24   --   24  24   BUN  6*   --   7*  9   CREATSERUM  1.16   --   1.14  1.09   CA  8.9   --   9.2  9.1   GLU  100*   --   136*  119*       No results for input(s): A

## 2018-02-20 NOTE — PLAN OF CARE
GASTROINTESTINAL - ADULT     • Minimal or absence of nausea and vomiting Not Progressing           PAIN - ADULT     • Verbalizes/displays adequate comfort level or patient's stated pain goal Not Progressing                    METABOLIC/FLUID AND ELECTROLYT

## 2018-02-21 NOTE — PLAN OF CARE
GASTROINTESTINAL - ADULT    • Minimal or absence of nausea and vomiting Adequate for Discharge        METABOLIC/FLUID AND ELECTROLYTES - ADULT    • Electrolytes maintained within normal limits Adequate for Discharge        PAIN - ADULT    • Verbalizes/disp

## 2018-02-21 NOTE — PROGRESS NOTES
.sb  Hematology/Oncology  Progress Note        NAME: Sadia Pretty - ROOM: 015/777-U - MRN: [id number] - Age: 52year old - : 1970    Subjective:  Feeling much better- ate cream of wheat and hamburger last night! Able to keep PO MSIR down.  Mood i non-distended, positive BS. Extremity: no edema   Skin: No rashes or lesions.     Recent Labs   Lab  02/20/18   0506   RBC  2.90*   HGB  9.1*   HCT  25.8*   MCV  89.1   MCH  31.5   MCHC  35.4   RDW  16.6*   WBC  3.6*   PLT  356     Recent Labs   Lab  02/1

## 2018-02-21 NOTE — DISCHARGE SUMMARY
[facility] Internal Medicine Discharge Summary   Patient ID:  Shaila Davis  [id number]  11 year old  5/25/1970    Admit date: 2/16/2018    Discharge date and time:   2/21/18    Attending Physician: Neri Luna MD     Primary Care Physician: Corrinne Endow DECADRON  Take 2 tabs in AM day after chemo. Then take 2 tabs in AM and PM for next 2 days, then as directed. What changed:  Another medication with the same name was added. Make sure you understand how and when to take each.      * dexamethasone 4 MG tabl known as:  CARAFATE  Take 10 mL (1 g total) by mouth 4 (four) times daily before meals and nightly.         STOP taking these medications    fentaNYL 12 MCG/HR Pt72  Commonly known as:  DURAGESIC  Replaced by:  fentaNYL 50 MCG/HR Pt72     gabapentin 300 MG hyponatremia down to 128, pt doing very well and repeat was 129. Will limit free water and recheck labs later this week.   See details below     Intractable throat/chest pain  -due to esophagitis and radiation recall from [facility]  -present for 2 months, ex Temp: 97.9 °F (36.6 °C)     No acute distress, alert and orientedx3  Lungs Clear  Heart Regular  Abdomen Benign    Total Time Coordinating Care: > than 30 minutes    Patient had opportunity to ask questions and state understand and agree with therapeutic

## 2018-02-23 NOTE — PAYOR COMM NOTE
[facility] Internal Medicine Discharge Summary   Patient ID:  Jarret Lung  [id number]  86 year old  5/25/1970     Admit date: 2/16/2018     Discharge date and time:   2/21/18     Attending Physician: Steward Kussmaul, MD      Primary Care Physician: Hayden Mcdowell

## 2018-02-28 PROBLEM — D64.9 ANEMIA: Status: ACTIVE | Noted: 2018-01-01

## 2018-03-31 PROBLEM — R11.10 INTRACTABLE VOMITING, PRESENCE OF NAUSEA NOT SPECIFIED, UNSPECIFIED VOMITING TYPE: Status: ACTIVE | Noted: 2018-01-01

## 2018-03-31 PROBLEM — R11.10 INTRACTABLE VOMITING: Status: ACTIVE | Noted: 2018-01-01

## 2018-03-31 PROBLEM — M54.5 LOW BACK PAIN, UNSPECIFIED BACK PAIN LATERALITY, UNSPECIFIED CHRONICITY, WITH SCIATICA PRESENCE UNSPECIFIED: Status: ACTIVE | Noted: 2018-01-01

## 2018-03-31 NOTE — ED INITIAL ASSESSMENT (HPI)
Back pain s/p \"difficult bowel movement\"  Patient reports constipation x 3 days prior to back pain

## 2018-04-01 NOTE — H&P
[facility] Hospitalist H&P       CC: Patient presents with:  Back Pain (musculoskeletal)  Nausea/Vomiting/Diarrhea (gastrointestinal)       PCP: Hemal Acuna MD    History of Present Illness: Jonna Vizcarra a 52year old male with PMH sig for stage IIIB ( docusate sodium 100 MG Oral Cap Take 100 mg by mouth 2 (two) times daily. Disp: 60 capsule Rfl: 0   potassium chloride 20 MEQ Oral Powd Pack Take 20 mEq by mouth daily.  Disp: 30 packet Rfl: 0   apixaban 2.5 MG Oral Tab Take 2 tablets (5 mg total) by mout 9.7   --    HGB  9.3*  7.5*  9.2*   MCV  94.6  95.0   --    PLT  443*  334   --        Recent Labs   Lab  03/31/18   1909  04/01/18   0612   NA  133*  136   K  3.7  4.3   CL  96  103   CO2  26  23   BUN  7*  6*   CREATSERUM  1.13  1.07   GLU  115*  99   C with poss irritation of nerve root, likely cause of pain  - started on prednisone 40mg daily for now  - prn oral morphine IR     Hyponatremia  -mild   -improved with IVF      anemia  - monitor   - hgb 9.3 -> 7.5 -> 9.2  - no sign of blood loss  - monitor

## 2018-04-01 NOTE — ED PROVIDER NOTES
Patient Seen in: [facility] AND [facility] Emergency Department    History   Patient presents with:  Back Pain (musculoskeletal)  Nausea/Vomiting/Diarrhea (gastrointestinal)    Stated Complaint: vomiting/back pain x 2days    HPI    51-year-old male with history °C)  Temp src: Oral  SpO2: 99 %  O2 Device: None (Room air)    Current:/78 (BP Location: Right arm)   Pulse 107   Temp 98.7 °F (37.1 °C) (Oral)   Resp 18   Ht 200.7 cm (6' 7\")   Wt 77.6 kg   SpO2 96%   BMI 19.28 kg/m²         Physical Exam   Constit CBC W/ DIFFERENTIAL - Abnormal; Notable for the following:     WBC 12.1 (*)     RBC 2.95 (*)     HGB 9.3 (*)     HCT 27.9 (*)     RDW 19.1 (*)      (*)     Neutrophil Absolute 8.5 (*)     Monocyte Absolute 1.7 (*)     All other components within n unspecified    Disposition:  Admit  3/31/2018  8:56 pm    Follow-up:  No follow-up provider specified.       Medications Prescribed:  Current Discharge Medication List        Present on Admission  Date Reviewed: 2/4/2018          ICD-10-CM Noted POA    * (P

## 2018-04-02 NOTE — PROGRESS NOTES
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  [facility] Hospitalist  Answering Service: [phone]        Subjective     Pain overall is improved but still present,. No longer using fentanyl patch per pt, didn't really work. Oral morphine does help.    No CP, SOB, or N/ --   7.1   HGB  9.3*  7.5*  9.2*  8.1*   MCV  94.6  95.0   --   93.6   PLT  443*  334   --   343       Recent Labs   Lab  03/31/18   1909  04/01/18   0612  04/02/18   0538   NA  133*  136  135*   K  3.7  4.3  3.6   CL  96  103  108   CO2  26  23  21*   BUN AREA: No visible mass. OTHER: Negative. LUMBAR DISC LEVELS: L1-L2: No significant disc/facet abnormality, spinal stenosis, or foraminal stenosis. L2-L3: No significant disc/facet abnormality, spinal stenosis, or foraminal stenosis.   L3-L4: No significan with the patient in the supine position. The patient's blood glucose level at the time of the exam was 96 dL/mg. The CT portion of the exam was performed for attenuation correction purposes only (not for diagnostic interpretation).  Automated exposure con in size and SUV max. 2. There are multiple new FDG avid skeletal metastases. 3. No demonstrable FDG avid metastatic intrathoracic/abdominal pelvic lymph nodes or hepatic/adrenal gland metastases.  4. There are nondescript densities and nodular opacities in

## 2018-04-02 NOTE — DIETARY NOTE
ADULT NUTRITION INITIAL ASSESSMENT    Pt is at high nutrition risk. Pt meets malnutrition criteria. RECOMMENDATIONS TO MD:  See Nutrition Intervention for plan of care.      CRITERIA FOR MALNUTRITION DIAGNOSIS:  Criteria for severe malnutrition diagno multivitamin/mineral  - Nutrition education: Discussed with pt importance of adequate nutrition via small frequent  nutrient dense meal.  - Feeding assistance: independent  - Coordination of nutrition care: collaboration with other providers  - Discharge a region, thigh region and calf region   - Fluid Accumulation: none   - Skin Integrity: intact and RN documentation reviewed. - Wilbur score 20    NUTRITION PRESCRIPTION:  Diet: Regular/General  Oral Nutrition Supplements (ONS) none--pt declined due to intole

## 2018-04-02 NOTE — PLAN OF CARE
CARDIOVASCULAR - ADULT    • Maintains optimal cardiac output and hemodynamic stability Progressing        DISCHARGE PLANNING    • Discharge to home or other facility with appropriate resources Progressing        PAIN - ADULT    • Verbalizes/displays [place]

## 2018-04-03 NOTE — PLAN OF CARE
Problem: Patient/Family Goals  Goal: Patient/Family Long Term Goal  Patient's Long Term Goal: to go home    Interventions:  - MRI   - pain control  - See additional Care Plan goals for specific interventions    Outcome: Progressing    Goal: Patient/Family consult to assist with strengthening/mobility  - Encourage toileting schedule   Outcome: Progressing      Problem: DISCHARGE PLANNING  Goal: Discharge to home or other facility with appropriate resources  INTERVENTIONS:  - Identify barriers to discharge w/

## 2018-04-03 NOTE — PAYOR COMM NOTE
--------------  ADMISSION REVIEW     Payor: Thanh Boyer  #:  [id number]  Authorization Number: [id number]    Admit date: 3/31/18  Admit time: 2210       Admitting Physician: Shana Lesches, MD  Attending Physician:  Nanci Nugent Comment: Left hand laceration and tendon repair        Smoking status: Former Smoker                                                              Packs/day: 0.50      Years: 20.00        Types: Cigarettes     Quit date: 5/1/2011  Smokeless tobacco: [place] Radiology exams  Viewed and reviewed by myself and findings discussed with patient including need for follow up        ED Course     Labs Reviewed   LIPASE - Abnormal; Notable for the following:        Result Value    Lipase <10 (*)     All other component Differential diagnosis includes metastasis, spinal cord compression, gastritis, electrolyte imbalance, dehydration, low suspicion for infection    Patient reports improvement in pain after multiple doses of IV pain medication, reports improvement in nausea Back Pain (musculoskeletal)  Nausea/Vomiting/Diarrhea (gastrointestinal)       PCP: Lyubov Huddleston MD    History of Present Illness: Tremaine Solis a 52year old male with PMH sig for 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 Disp: 60 capsule Rfl: 0   potassium chloride 20 MEQ Oral Powd Pack Take 20 mEq by mouth daily.  Disp: 30 packet Rfl: 0   apixaban 2.5 MG Oral Tab Take 2 tablets (5 mg total) by mouth WBC  12.1*  9.7   --    HGB  9.3*  7.5*  9.2*   MCV  94.6  95.0   --    PLT  443*  334   --        Recent Labs   Lab  03/31/18   1909  04/01/18   0612   NA  133*  136   K  3.7  4.3   CL  96  103   CO2  26  23   BUN  7*  6*   CREATSERUM  1.13  1.07   GLU  1 - MRI resulted with small foci of metastatic disease throughout the spine consistent with metastatstic disease, largest lesion at S2 with poss irritation of nerve root, likely cause of pain  - started on prednisone 40mg daily for now  - prn oral morphine I morphINE Sulfate ER (MS CONTIN) CR tab 15 mg     Date Action Dose Route User    4/3/2018 0813 Given 15 mg Oral Iam Gonzales RN    4/3/2018 0012 Given 15 mg Oral Lady Keyona RN    4/2/2018 1621 Given 15 mg Oral Iam Gonzales RN      Normal Sa - no radicular pain, no signs of bowel or bladder incontience or saddle anesthesia   - MRI with likely bone mets  - d/w Onc, will consult IR for possible bone biopsy.    - MRI resulted with small foci of metastatic disease throughout the spine consistent w BP: (131-163)/() 159/103     Intake/Output:     Intake/Output Summary (Last 24 hours) at 04/02/18 1508  Last data filed at 04/02/18 0459    Gross per 24 hour   Intake             1877 ml   Output                0 ml   Net             1877 ml                  Recent Labs   Lab  03/31/18   1909   ALT  22   AST  25   ALB  2.7*         No results for input(s): PGLU in the last 72 hours.     No results for input(s): TROP in the last 72 hours.     Imaging:  Mri Spine Lumbar (w+wo) (cpt=72286)     Result Da PROCEDURE:   MRI SPINE LUMBAR (W+WO) (CPT=72158)  COMPARISON:     None.   INDICATIONS: severe sudden onset low back pain, hx lung CA, with possible metastatic disease  TECHNIQUE:  A comprehensive examination was performed utilizing a variety of imaging plan diffuse disc bulge with mild facet arthropathy. No spinal canal or neural foraminal compromise.       CONCLUSION:  1.  Innumerable marrow replacing foci throughout the lumbosacral spine that demonstrate concomitant postcontrast enhancement, compatible with DATE OF SERVICE: 03.27.2018 WHOLE BODY PET CT TUMOR IMAGING CLINICAL INDICATION: The patient is a 31-year-old male with right lung carcinoma. PET/CT is obtained for restaging purposes. COMPARISON: CT abdomen pelvis 3/6/2018. FDG PET/CT 9/28/2017.  TECHNIQUE SUV max 5.8, T12 vertebral body SUV max 14.3, medial left iliac bone SUV max 9.4, left sacral wing SUV max 17.3 and left pubic bone SUV max 8.6.  There are other mildly FDG avid lesions throughout the axial and appendicular skeleton, including ribs, humeri

## 2018-04-03 NOTE — PLAN OF CARE
CARDIOVASCULAR - ADULT    • Maintains optimal cardiac output and hemodynamic stability Adequate for Discharge        DISCHARGE PLANNING    • Discharge to home or other facility with appropriate resources Adequate for Discharge        PAIN - ADULT    • Verb

## 2018-04-03 NOTE — CONSULTS
Hematology/Oncology Consult Note        NAME: Betty Sandoval - ROOM: [facility] - MRN: [id number] - Age: 52year old - : 1970    Reason for Consult:  Lung cancer    Mr. Preethi Harrell is a 52 y.o male well known to [facility] oncology for the treatment of lung can chest pain  GI: denies abdominal pain, diarrhea, constipation  : denies dysuria or changes in stream, hematuria   MSK: denies back pain  Neuro: denies headaches, no strokes or seizure history  Psyche: denies depression or anxiety  Hematologic: per HPI  E possibly  inflammatory in etiology. 5. Moderately large bilateral pleural effusions are present.    =====  CONCLUSION:   1.  Innumerable marrow replacing foci throughout the lumbosacral spine that demonstrate concomitant postcontrast enhancement, compatibl

## 2018-04-03 NOTE — PROGRESS NOTES
Caryle Crown  [id number]  [unfilled]    Post procedure/ recovery hand-off report given to [address] [place] [address]. Patient's vital signs are stable. Procedural site is dry and intact with  no signs and symptoms of bleeding/ hematoma.     Alfred Reza RN

## 2018-04-03 NOTE — DISCHARGE SUMMARY
[facility] Internal Medicine Discharge Summary   Patient ID:  Neno Craig  [id number]  31 year old  5/25/1970    Admit date: 3/31/2018    Discharge date and time: 4/3/2018  4:33 PM     Attending Physician: No att. providers found     Primary Care Physician: Maria G Loredo known as:  TYLENOL  Take 2 tablets (650 mg total) by mouth every 6 (six) hours as needed. docusate sodium 100 MG Caps  Commonly known as:  COLACE  Take 100 mg by mouth 2 (two) times daily.      gabapentin 600 MG Tabs  Commonly known as:  NEURONTIN  Take follow up labs per onc     anemia  - monitor   - hgb 9.3 -> 7.5 -> 9.2->8.1->9.2  -likley 2/2 chemo  - no sign of blood loss  - monitor     Stage IIIB (T2N2MX) adenocarcinoma of the right upper lobe s/p concurrent chemo/XRT  -XRT finished in December  -fin abnormality, spinal stenosis, or foraminal stenosis. L3-L4: No significant disc/facet abnormality, spinal stenosis, or foraminal stenosis. L4-L5: There is a small diffuse disc bulge with mild bilateral facet arthropathy.  Superimposed central disc protrus purposes only (not for diagnostic interpretation). Automated exposure control and ALARA manual techniques for patient specific dose reduction were followed while maintaining the necessary diagnostic image quality.  FINDINGS: HEAD/NECK: There are no hypermet gland metastases. 4. There are nondescript densities and nodular opacities in the right lower lobe, possibly inflammatory in etiology. 5. Moderately large bilateral pleural effusions are present.        Operative Procedures: IR bone biopsy     Day of discha

## 2018-04-03 NOTE — BRIEF PROCEDURE NOTE
[facility]  Procedure Note    Cathleen Reyes Patient Status:  Inpatient    1970 MRN [id number]   Location [facility] 5SW/SE Attending Emerald Pizano MD   Hosp Day # 3 PCP Magdalena Apodaca MD     Procedure: L4 vertebral

## 2018-04-04 PROBLEM — C79.51 BONE METASTASIS (HCC): Status: ACTIVE | Noted: 2018-01-01

## 2018-04-04 NOTE — PAYOR COMM NOTE
--------------  DISCHARGE REVIEW    Payor: Thanh Boyer  #:  [id number]  Authorization Number: [id number]    Admit date: 3/31/18  Admit time:  2210  Discharge Date: 4/3/2018  4:33 PM     Admitting Physician: Darrell Woodson MD  Attend -finished Chemo in feb  -follow up with onc,  Cont prior meds     HTN  -home meds     FN:  - IVF:83/hr  - Diet: regular diet     DVT Prophy: on eliquis, hold for bone biopsy, got 2.5 mg this AM (ppx dosing).  D/W Onc, ok to h old, if no procedure tomorrow, MSK:  no clubbing, no cyanosis.   No Lower extremity edema  Skin: no rashes or lesions, well perfused  Psych: mood stable, cooperative  Neuro: no focal deficits     Medications      • morphINE Sulfate ER  15 mg Oral Q8H [address]   • Senna  8.6 mg Oral BID   • pr PROCEDURE:   MRI SPINE LUMBAR (W+WO) (CPT=72158)  COMPARISON:     None.   INDICATIONS: severe sudden onset low back pain, hx lung CA, with possible metastatic disease  TECHNIQUE:  A comprehensive examination was performed utilizing a variety of imaging plan diffuse disc bulge with mild facet arthropathy. No spinal canal or neural foraminal compromise.       CONCLUSION:  1.  Innumerable marrow replacing foci throughout the lumbosacral spine that demonstrate concomitant postcontrast enhancement, compatible with DATE OF SERVICE: 03.27.2018 WHOLE BODY PET CT TUMOR IMAGING CLINICAL INDICATION: The patient is a 49-year-old male with right lung carcinoma. PET/CT is obtained for restaging purposes. COMPARISON: CT abdomen pelvis 3/6/2018. FDG PET/CT 9/28/2017.  TECHNIQUE SUV max 5.8, T12 vertebral body SUV max 14.3, medial left iliac bone SUV max 9.4, left sacral wing SUV max 17.3 and left pubic bone SUV max 8.6.  There are other mildly FDG avid lesions throughout the axial and appendicular skeleton, including ribs, humeri 40 mg for 2 days then 30mg for 2 days then 20 mg for 2 days then 10 mg for 2 days then stop     sennosides 8.6 MG Tabs  Commonly known as:  SENOKOT  Take 1 tablet (8.6 mg total) by mouth 2 (two) times daily.         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   INDICATIONS: severe sudden onset low back pain, hx lung CA, with possible metastatic disease  TECHNIQUE: A comprehensive examination was performed utilizing a variety of imaging planes and CONCLUSION:  1. Innumerable marrow replacing foci throughout the lumbosacral spine that demonstrate concomitant postcontrast enhancement, compatible with multifocal osseous metastatic disease. There are also metastases within both iliac bones.  The largest

## 2018-04-05 NOTE — PAYOR COMM NOTE
DISCHARGE REVIEW    Payor: Thanh [facility] #:  [id number]  Authorization Number: [id number]    Admit date: 3/31/18  Admit time:  2210  Discharge Date: 4/3/2018  4:33 PM     Admitting Physician: Ramana Hester MD  Attending Physician: with the same name was added. Make sure you understand how and when to take each. * morphINE Sulfate ER 15 MG Tbcr  Commonly known as:  MS CONTIN  Take 1 tablet (15 mg total) by mouth every 8 (eight) hours. What changed:   You were already taking a med 2/2 bone mets  - no radicular pain, no signs of bowel or bladder incontience or saddle anesthesia   - MRI with likely bone mets  - IR biopsy on 4/3, can resume eliquis on 4/4 per d/w IR  - MRI resulted with small foci of metastatic disease throughout the s entirety of  the S2 vertebral body and measures approximately 3.2 x 2.4 cm. Extension of this lesion toward the left S1 and S2 sacral ala also noted. There are marrow replacing lesions within both iliac bones.  No definite pathologic compression deformity i (QUN=60588)    Result Date: 3/27/2018  DATE OF SERVICE: 03.27.2018 WHOLE BODY PET CT TUMOR IMAGING CLINICAL INDICATION: The patient is a 51-year-old male with right lung carcinoma. PET/CT is obtained for restaging purposes.  COMPARISON: CT abdomen pelvis 3/ left scapula SUV max 7.7, lower sternum SUV max 5.8, T12 vertebral body SUV max 14.3, medial left iliac bone SUV max 9.4, left sacral wing SUV max 17.3 and left pubic bone SUV max 8.6.  There are other mildly FDG avid lesions throughout the axial and append

## 2018-04-15 PROBLEM — R09.02 HYPOXIA: Status: ACTIVE | Noted: 2018-01-01

## 2018-04-15 PROBLEM — C34.90 PRIMARY MALIGNANT NEOPLASM OF LUNG METASTATIC TO OTHER SITE, UNSPECIFIED LATERALITY (HCC): Status: ACTIVE | Noted: 2018-01-01

## 2018-04-15 PROBLEM — J18.9 SEPSIS DUE TO PNEUMONIA (HCC): Status: ACTIVE | Noted: 2018-01-01

## 2018-04-15 PROBLEM — J20.9 ACUTE BRONCHITIS: Status: ACTIVE | Noted: 2018-01-01

## 2018-04-15 PROBLEM — J20.9 ACUTE BRONCHITIS, UNSPECIFIED ORGANISM: Status: ACTIVE | Noted: 2018-01-01

## 2018-04-15 PROBLEM — A41.9 SEPSIS DUE TO PNEUMONIA (HCC): Status: ACTIVE | Noted: 2018-01-01

## 2018-04-15 NOTE — H&P
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 Disp: 30 packet Rfl: 0   apixaban 2.5 MG Oral Tab Take 2 tablets (5 mg total) by mouth 2 (two) times daily.  Disp: 60 tablet Rfl: 3       Soc Hx     Smoking status: For known right upper lobe malignancy.      Dictated by (CST): Kimberly Eagle MD on 4/15/2018 at 9:04     Approved by (CST): Kimberly Eagle MD on 4/15/2018 at 9:11

## 2018-04-15 NOTE — CONSULTS
[facility] Pulmonary, Critical Care and Sleep    Cherie Jang Patient Status:  Inpatient    1970 MRN [id number]   Location 461/461-A PCP Mallorie Parr MD     Date of Admission: 2018  History of Present Illness: Pt is a 52year old male consulted docusate sodium 100 MG Oral Cap Take 100 mg by mouth 2 (two) times daily. Disp: 60 capsule Rfl: 0   potassium chloride 20 MEQ Oral Powd Pack Take 20 mEq by mouth daily.  Disp: 30 packet Rfl: 0   apixaban 2.5 MG Oral Tab Take 2 tablets (5 mg total) by mouth Potassium 3.5 3.3 - 5.1 mmol/L   Chloride 97 95 - 110 mmol/L   CO2 23 22 - 32 mmol/L   BUN 10 8 - 20 mg/dL   Creatinine 1.20 0.50 - 1.50 mg/dL   Calcium, Total 8.6 8.5 - 10.5 mg/dL   BUN/CREA Ratio 8.3 (L) 10.0 - 20.0   Anion Gap 9 0 - 18 mmol/L   Calculat Adenovirus PCR: Negative Negative   Coronavirus 229E PCR: Negative Negative   Coronavirus Hku1 PCR: Negative Negative   Coronavirus Nl63 PCR: Negative Negative   Coronavirus Oc43 PCR: Negative Negative   Metapneumovirus PCR: Negative Negative   Rhinovirus/ Eosinophil Absolute 0.0 0.0 - 0.7 K/UL   Basophil Absolute 0.0 0.0 - 0.2 K/UL     Imaging:  CXR 4/2018:   1. CHF/fluid overload with pulmonary interstitial edema and small bibasilar pleural effusions.   2. Right hilar prominence possibly representing lympha

## 2018-04-15 NOTE — CONSULTS
[facility] AND CLINICS  Report of Consultation    Justin Sangita Patient Status:  Inpatient    1970 MRN [id number]   Location [facility] 4W/SW/SE Attending Marko Montes MD   Hosp Day # 0 PCP Ramandeep Montes De Oca MD     Reason for Consultation:  Kvng Segundo ipratropium-albuterol (DUONEB) nebulizer solution 3 mL, 3 mL, Nebulization, Q4H PRN  •  MethylPREDNISolone Sodium Succ (Solu-MEDROL) injection 60 mg, 60 mg, Intravenous, Q8H  •  apixaban (ELIQUIS) tab 5 mg, 5 mg, Oral, BID  •  docusate sodium (COLACE) cap edema. Neurological: Grossly intact. Lymphatics: There is no palpable lymphadenopathy throughout in the cervical or supraclavicular, regions.   ECOG PS: 2    Laboratory Data:      Lab Results  Component Value Date   WBC 7.4 04/15/2018   HGB 8.4 04/15/201

## 2018-04-15 NOTE — ED INITIAL ASSESSMENT (HPI)
Sob for the last day, also c/o minor cough and vomiting. PT states he is receiving radiation therapy for lung cancer.

## 2018-04-15 NOTE — ED NOTES
Patient presents to ED c/o sob x 1.5 days. Tachypneic, tachycardic and hypoxic upon arrival. Patient states that he has lung CA and is currently getting radiation. The ca has moved to his back area and he has 3/10 pain there.  Patient took his own morphine

## 2018-04-15 NOTE — ED PROVIDER NOTES
Patient Seen in: [facility] AND [facility] Emergency Department    History   Patient presents with:  Dyspnea KOBY SOB (respiratory)    Stated Complaint: SOB    HPI    Pt is 51 yo M currently undergoing radiation for metastatic lung CA who p/w worsening SOB x 2 da no murmurs  Resp: CTAB, no wheezes.  +tachypnea  Ab: soft, nontender, no distension  Extremities: FROM of all extremities, no cyanosis/clubbing/edema  Neuro: CN intact, normal speech, normal gait, 5/5 motor strength in all extremities, no focal deficits  SK HRS.    IMPRESSION  1. Findings suspicious for mild to moderate pulmonary edema and small bilateral pleural effusions.   2. Mild prominence of the right hilar shadow, which may be related to prominence of central pulmonary vessels accentuated by mild rightw Admission  Date Reviewed: 2/4/2018          ICD-10-CM Noted POA    Acute bronchitis J20.9 4/15/2018 Unknown

## 2018-04-16 NOTE — PROGRESS NOTES
Pulmonary Progress Note     Assessment / Plan:  1. Dyspnea and cough - concern for volume overload given CT findings. PNA is still in the ddx. No e/o PE  - wean O2 as able  - diurese  - TTE pending  - Zosyn  - bd protocol  2.  Lung cancer - stage 4  - per o

## 2018-04-16 NOTE — PAYOR COMM NOTE
--------------  ADMISSION REVIEW     Payor: Thanh [facility] #:  [id number]  Authorization Number: Radha Mj date: 4/15/18  Admit time: Amy Page 1527       Admitting Physician: Russell Gustafson MD  Attending Physician:  Siddharth Bravo MD  [facility] Constitutional and vital signs reviewed. All other systems reviewed and negative except as noted above.     Physical Exam   ED Triage Vitals  BP: 150/100 [04/14/18 2314]  Pulse: 126 [04/14/18 2314]  Resp: 24 [04/14/18 2314]  Temp: 98.4 °F (36.9 °C) [04 LACTIC ACID, PLASMA   RAINBOW DRAW BLUE   RAINBOW DRAW LAVENDER   RAINBOW DRAW DARK GREEN   RAINBOW DRAW LIGHT GREEN   RAINBOW DRAW GOLD   RAINBOW DRAW LAVENDER TALL (BNP)   RESPIRATORY PANEL FLU EXPANDED   BLOOD CULTURE   BLOOD CULTURE     EKG    Rate, in Pt did have elevation in temperature while in ED. Blood cultures, LA, respiratory panel ordered and pt started on zosyn since he was recently in hospital.   duoneb/steroids started for cough/bronchitis.     Dr. Adriane Goldsmith (pulm) and Dr. Jacquelyn Griffith (oncology) notif -Etiology not entirely clear. Treating for pneumonia. Dopplers neg for DVT. CT chest ordered. Procal mildly elevated.     -cont zosyn  -eliquis bumped to 5 BID from 2.5  -cont steroids, BD  -d/w pulm, agree with plan john    Severe low back pain due to 12 point systems reviewed, please see HPI for pertinent positives, otherwise negative    History     PMH  Past Medical History:   Diagnosis Date   • Agatston coronary artery calcium score less than 100 2014 2014 score zero   • Childhood asthma     Out g BP: 130/86    Exam  Gen: No acute distress, alert and oriented x3  Neck Supple, no JVD  Pulm: course BS b/l, ok effort, no current tachypnea   CV: Heart with regular rate and rhythm, No murmurs, rubs, gallops  Abd: Abdomen soft, nontender, nondistended, no Date Action Dose Route User    4/16/2018 0932 Given 100 mg Oral Itz Simon RN    4/15/2018 2037 Given 100 mg Oral Karime Cordon RN      gabapentin (NEURONTIN) cap 600 mg     Date Action Dose Route User    4/16/2018 0932 Given 600 mg Oral Ángel, Ca Date Action Dose Route User    4/16/2018 0344 New Bag 3.375 g Intravenous Emmanuel Regalado RN    4/15/2018 1641 New Bag 3.375 g Intravenous Antonio Morales RN      [facility]) tab TABS 8.6 mg     Date Action Dose Route User    4/16/2018 0932 Given 8   earliest 12      Past Surgical History:  No date: CYTOLOGY FINE NEEDLE Bilateral      Comment: 4L, 4R LN  2010: OTHER SURGICAL HISTORY      Comment: Left hand laceration and tendon repair  Allergies: No Known Allergies  Medications:  Outpt meds:     No • Diabetes Paternal Grandmother     • Childhood asthma [OTHER] Brother     • Heart Disorder Paternal Aunt         MI      REVIEW OF SYSTEMS:   Negative except as noted in HPI. OBJECTIVE:  Temp (24hrs), Av.5 °F (37.5 °C), Min:98 °F (36.7 °C), Max:101. Hold Gold Auto Resulted     -RAINBOW DRAW LAVENDER TALL (BNP)   Collection Time: 04/14/18 11:28 PM   Result Value Ref Range   Hold Lavender Auto Resulted     -CBC W/ DIFFERENTIAL   Collection Time: 04/14/18 11:28 PM   Result Value Ref Range   WBC 7.6 4.0 - Chloride 100 95 - 110 mmol/L   CO2 24 22 - 32 mmol/L   BUN 8 8 - 20 mg/dL   Creatinine 1.11 0.50 - 1.50 mg/dL   Calcium, Total 8.6 8.5 - 10.5 mg/dL   BUN/CREA Ratio 7.2 (L) 10.0 - 20.0   Anion Gap 9 0 - 18 mmol/L   Calculated Osmolality 278 275 - 295 mOsm/ Onc evaluation. Pt would be appropiate for palliative care. Will consult. Analgesia. 3. Proph  Eliquis. 4. Dispo Full code. Palliative care eval as above.  May need to consider code status.            Consults signed by Nabil Rogel MD at 4/15/2018 Problem Relation Age of Onset   • Diabetes Paternal Grandmother     • Childhood asthma [OTHER] Brother     • Heart Disorder Paternal Aunt         MI       reports that he quit smoking about 6 years ago. His smoking use included Cigarettes.  He has a 10.00 p Blood pressure 130/86, pulse 96, temperature 98 °F (36.7 °C), temperature source Oral, resp. rate 18, height 2.007 m (6' 7\"), weight 78.5 kg (173 lb), SpO2 98 %. General: Patient is alert and oriented x 3, not in acute distress.   Vital Signs: /86 ( Thanks for keeping me involved in Mr. Jyothi Gallego care. I will continue to follow him while inpatient. Please do not hesitate to contact me directly with any specific questions or concerns.     Herbert Chavis.  Erica López MD  Hematology and Oncology  [facility]

## 2018-04-16 NOTE — PROGRESS NOTES
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 No N/V.   No CP      Objective     OBJECTIVE 7. 3   HGB  8.9*  8.4*  8.8*   MCV  91.5  91.9  91.8   PLT  417*  387  391       Recent Labs   Lab  04/14/18   2328  04/15/18   0444  04/16/18   0459   NA  129*  133*  133*   K  3.5  4.2  3.9   CL  97  100  102   CO2  23  24  22   BUN  10  8  13   CREATSERU 4/15/2018 at 21:06

## 2018-04-16 NOTE — CM/SW NOTE
4/16CM-Per rounds, the Patient might need home oxygen. Case Management to leave a  DME Face to Face Form needs is in the Patient’s chart to be completed  and signed by the Patient’s MD and the oxygen sats need to be charted.  **Case Management CANNOT proces

## 2018-04-16 NOTE — CONSULTS
Dr. Valeriy Coburn asked our service to evaluate patient for palliative care needs. I checked with Dr. Danelle Ramos who asked me to hold off for now. She told me that she would like to discuss potential palliative care consult with patient.     Thus, will hold off for now

## 2018-04-16 NOTE — PROGRESS NOTES
Hematology/Oncology  Progress Note        NAME: Sadia Pretty - ROOM: [facility]555-I - MRN: [id number] - Age: 52year old - : 1970    Subjective:  No acute events overnight.  Feels better since admission but still short of breath     Medications:  • fu bilaterally    Heart: Regular rate and rhythm, normal S1S2, no murmur. Abdomen: soft, non-tender, non-distended, positive BS. Extremity: no edema   Skin: No rashes or lesions.     Recent Labs   Lab  04/16/18   0459   RBC  2.85*   HGB  8.8*   HCT  26.1*

## 2018-04-17 NOTE — PLAN OF CARE
Patient was nauseous much of day, and vomiting. Refused all meds except pain medication and zofran. Slept for a few hours throughout the day, stated felt better at dinner time.  Ambulated in villafana X 1.

## 2018-04-17 NOTE — DIETARY NOTE
ADULT NUTRITION INITIAL ASSESSMENT    Pt is at high nutrition risk. Pt meets malnutrition criteria.       CRITERIA FOR MALNUTRITION DIAGNOSIS:  Criteria for severe malnutrition diagnosis: chronic illness related to wt loss greater than 10% in 6 months, e PAST MEDICAL HISTORY:   Past Medical History:   Diagnosis Date   • Agatston coronary artery calcium score less than 100 2014 2014 score zero   • Childhood asthma     Out grew sx in adolescence   • Elevated LDL cholesterol level    • Lung cancer (HCC) 10 piperacillin-tazobactam  3.375 g Intravenous Q8H   • ipratropium-albuterol  3 mL Nebulization 6 times per day   • benadryl/lidocaine/mylanta 1:1:1  10 mL Oral TID AC and HS       LABS: reviewed    Recent Labs   04/15/18  0444 04/16/18  0459 04/17/18  0543

## 2018-04-17 NOTE — PAYOR COMM NOTE
--------------  ADMISSION REVIEW     Payor: Thanh Boyer  #:  [id number]  Authorization Number: Blaire Bermudez date: 4/15/18  Admit time: 1643       Admitting Physician: Chinmay Menchaca MD  Attending Physician:  Jens Mcintosh MD  [facility] reviewed. All other systems reviewed and negative except as noted above.     Physical Exam   ED Triage Vitals  BP: 150/100 [04/14/18 2314]  Pulse: 126 [04/14/18 2314]  Resp: 24 [04/14/18 2314]  Temp: 98.4 °F (36.9 °C) [04/14/18 2314]  Temp src: Tempora DRAW LAVENDER   RAINBOW DRAW DARK GREEN   RAINBOW DRAW LIGHT GREEN   RAINBOW DRAW GOLD   RAINBOW DRAW LAVENDER TALL (BNP)   RESPIRATORY PANEL FLU EXPANDED   BLOOD CULTURE   BLOOD CULTURE     EKG    Rate, intervals and axes as noted on EKG Report.   Rate: 12 duoneb/steroids started for cough/bronchitis. Dr. Cristiane Dodge (pulm) and Dr. Aris Steinberg (oncology) notified and will see patient in consultation.        Admission disposition: 4/15/2018  1:00 AM           Disposition and Plan     Clinical Impression:  Acute br BID from 2.5  -cont steroids, BD  -d/w pulm, agree with plan john    Severe low back pain due to bone mets  -s/p 4 of 14 doses of XRT  -on hold while in house  -cont home prn MS contin and IR     Hyponatremia, chronic  -130-133 baseline  -129 on admission, coronary artery calcium score less than 100 2014 2014 score zero   • Childhood asthma     Out grew sx in adolescence   • Elevated LDL cholesterol level    • Lung cancer ([address] 10/2017   • Tattoo of skin     earliest 12        [address]  Past Surgical History regular rate and rhythm, No murmurs, rubs, gallops  Abd: Abdomen soft, nontender, nondistended, no organomegaly, bowel sounds present  MSK:  no clubbing, no cyanosis.   No Lower extremity edema  Skin: no rashes or lesions, well perfused  Psych: mood stable, mg Intravenous Mickie Ashraf RN      gabapentin (NEURONTIN) cap 600 mg     Date Action Dose Route User    4/16/2018 2111 Given 600 mg Oral Joanna Man RN    4/16/2018 1652 Given 600 mg Oral Mickie Ashraf RN      ipratropium-albuterol (DUONEB) nebulize innumerrable bone metastatic lesions. Pt started on palliative RT 5 days ago. Pt had planned opdiva, Xgeva but has not started yet. He noted worsening dyspnea starting about 3 days ago and continued to worsened.  Pt had remained on steroids for the last sev GREEN   Collection Time: 04/14/18 11:28 PM   Result Value Ref Range   Hold Lt Green Auto Resulted     -RAINBOW DRAW LIGHT GREEN   Collection Time: 04/14/18 11:28 PM   Result Value Ref Range   Hold Lt Green Auto Resulted     -RAINBOW DRAW GOLD   Collection PLASMA   Collection Time: 04/15/18  1:11 AM   Result Value Ref Range   Lactic Acid 1.3 0.5 - 2.2 mmol/L   -BASIC METABOLIC PANEL (8)   Collection Time: 04/15/18  4:44 AM   Result Value Ref Range   Glucose 164 (H) 70 - 99 mg/dL   Sodium 133 (L) 136 - 144 mm and dopplers. If PE found would consider escalating to SQ lovenox at treatment dose. Continue abx, steroids and BD. Hold on diuretics for now given upcoming contrast load.    2. Lung cancer, Stage 4  s/p chemo radiation and palliative RT recently to s pain, diarrhea, constipation  : denies dysuria or changes in stream, hematuria   MSK: denies back pain  Neuro: denies headaches, no strokes or seizure history  Psyche: denies depression or anxiety  Hematologic: per HPI  Endocrine: denies thyroid history outpatient  -Plan to start Erleen Folds as outpatient     Bone mets  -Continue MS contin 30 mg po bid and MS IR 30 mg po q4h prn  - xgeva as outpatient     Proph  -Continue docusate/senna    4/16 PROGRESS NOTES  Puma Elye is a 52year old male with [97.8 °F (36.6 °C)-99 °F (37.2 °C)] 97.8 °F (36.6 °C)  Pulse:  [101-111] 108  Resp:  [18] 18  BP: (129-140)/(78-89) 140/89     Intake/Output:     Intake/Output Summary (Last 24 hours) at 04/16/18 1438  Last data filed at 04/16/18 0659    Gross per 24 hour 13   CREATSERUM  1.20  1.11  1.24   CA  8.6  8.6  8.7   GLU  116*  164*  170*      4/16 PULMONARY PROGRESS NOTE  Assessment / Plan:  5. Dyspnea and cough - concern for volume overload given CT findings. PNA is still in the ddx.  No e/o PE  - wean O2 as able with normal EF  -d/w pulm, agree with plan above     Severe low back pain due to bone mets  -s/p 4 of 14 doses of XRT  -on hold while in house  -cont home prn MS contin and IR     Hyponatremia, chronic  -130-133 baseline  -129 on admission, 133 now      An Oral TID   • morphINE Sulfate ER  30 mg Oral 2 times per day   • potassium chloride  20 mEq Oral Daily   • Senna  8.6 mg Oral BID   • piperacillin-tazobactam  3.375 g Intravenous Q8H   • ipratropium-albuterol  3 mL Nebulization 6 times per day   • benadryl

## 2018-04-17 NOTE — PROGRESS NOTES
Pulmonary Progress Note      NAME: Stephen Holt - ROOM: 319/668-S - MRN: [id number] - Age: 52year old - : 1970    Assessment/Plan:  1. Dyspnea and cough - concern for volume overload given CT findings. PNA is still in the ddx. No e/o PE.  Radiat BS.   Extremity: no edema    Recent Labs   Lab  04/17/18   0512   RBC  2.60*   HGB  8.0*   HCT  23.5*   MCV  90.2   MCH  30.7   MCHC  34.0   RDW  18.9*   WBC  7.4   PLT  332     Recent Labs   Lab  04/15/18   0444  04/16/18   0459  04/17/18   0512   GLU  16

## 2018-04-17 NOTE — PROGRESS NOTES
Hematology/Oncology  Progress Note        NAME: Chuck Amanda - ROOM: [facility]-P - MRN: [id number] - Age: 52year old - : 1970    Subjective:  No acute events overnight. Still short of breath and nauseated     Medications:  • furosemide  40 mg Intr Regular rate and rhythm, normal S1S2, no murmur. Abdomen: soft, non-tender, non-distended, positive BS. Extremity: no edema   Skin: No rashes or lesions. Recent Labs   Lab  04/17/18   0512   RBC  2.60*   HGB  8.0*   HCT  23.5*   MCV  90.2   MCH  30.

## 2018-04-18 NOTE — PROGRESS NOTES
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

## 2018-04-18 NOTE — PROGRESS NOTES
Hematology/Oncology  Progress Note        NAME: Armand Tom - ROOM: 308/810-B - MRN: [id number] - Age: 52year old - : 1970    Subjective:  No acute events overnight. Afebrile.  Reports feeling better and less short of breath     Medications:  • Heart: Regular rate and rhythm, normal S1S2, no murmur. Abdomen: soft, non-tender, non-distended, positive BS. Extremity: no edema   Skin: No rashes or lesions.     Recent Labs   Lab  04/17/18   0512   RBC  2.60*   HGB  8.0*   HCT  23.5*   MCV  90.2

## 2018-04-18 NOTE — PROGRESS NOTES
Pulmonary Progress Note     Assessment / Plan:  1. Dyspnea and cough - concern for volume overload given CT findings. PNA is still in the ddx. No e/o PE. Radiation pneumonitis considered less likely.  CXR shows improved pulm edema  - wean O2 as able  - lasi

## 2018-04-19 NOTE — PROGRESS NOTES
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

## 2018-04-19 NOTE — PROGRESS NOTES
Hematology/Oncology  Progress Note        NAME: José Luis Nobles - ROOM: 868/637-D - MRN: [id number] - Age: 52year old - : 1970    Subjective:  No acute events overnight.  Afebrile  Continues to have dry heaving and nausea     Medications:  • potass murmur. Abdomen: soft, non-tender, non-distended, positive BS. Extremity: no edema   Skin: No rashes or lesions.     Recent Labs   Lab  04/19/18   0515   RBC  2.90*   HGB  8.9*   HCT  26.1*   MCV  90.1   MCH  30.6   MCHC  34.0   RDW  18.6*   WBC  7.9

## 2018-04-19 NOTE — PROGRESS NOTES
Pulmonary Progress Note     Assessment / Plan:  1. Dyspnea and cough - concern for volume overload given CT findings. PNA is still in the ddx. No e/o PE. Radiation pneumonitis considered less likely.  Overall improved  - wean O2 as able  - hold lasix  - TTE

## 2018-04-19 NOTE — PAYOR COMM NOTE
--------------  CONTINUED STAY REVIEW    Payor: Thanh Boyer  #:  [id number]  Authorization Number: Claudette Park date: 4/15/18  Admit time: Amy Page 1527    Admitting Physician: Elisabet Borges MD  Attending Physician:  Yunior Herrera MD  Pr ondansetron HCl (ZOFRAN) injection 4 mg     Date Action Dose Route User    4/18/2018 2017 Given 4 mg Intravenous Geremias Spears RN    4/18/2018 1358 Given 4 mg Intravenous Serafin Park RN      Piperacillin Sod-Tazobactam So (ZOSYN) 3.375 g in de seizure history  Psyche: denies depression or anxiety  Hematologic: per HPI  Endocrine: denies thyroid history        Physical Exam:  /87 (BP Location: Right arm)   Pulse 118   Temp 99.6 °F (37.6 °C) (Oral)   Resp 20   Ht 2.007 m (6' 7\")   Wt 78.5 k Woodrow Hood MD     4/18PCP NOTE  Irwin Jamison a 52year old male with PMH sig for 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 s/p concurrent chemo/XRT last round of chemo in Feb, hs of radiation induced esophagitis with recent admission to ask questions and note understanding and agreeing with therapeutic plan as outlined     Onetha Sicard, MD  [facility] Hospitalist     Answering Service number: [phone]     Time spent with patient: 35 mins with > 50% spent counseling patient face to fac hydroxide, bisacodyl     Data Review:       Labs:           Recent Labs   Lab  04/16/18   0459  04/17/18   0512   WBC  7.3  7.4   HGB  8.8*  8.0*   MCV  91.8  90.2   PLT  391  332               Recent Labs   Lab  04/16/18   0459  04/17/18   0512  04/18/18

## 2018-04-20 NOTE — PROGRESS NOTES
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

## 2018-04-20 NOTE — CONSULTS
[facility]    PATIENT'S NAME: Laurasusana Powell   ATTENDING PHYSICIAN: Katarzyna Swartz MD   CONSULTING PHYSICIAN: Bj Kumar MD   PATIENT ACCOUNT#:   [id number]    LOCATION:  [address] [place] [address] #:   [id number]       DATE OF in the arms and legs is normal.  He is cachectic. Deep tendon reflexes symmetrical without Babinski signs. Joint position sense and vibration are normal.  No carotid bruits.       LABORATORY DATA:  Labs revealed normal chemistry group except for glucose o

## 2018-04-20 NOTE — PROGRESS NOTES
Hematology/Oncology  Progress Note        NAME: Shaila Davis - ROOM: 909/637-Z - MRN: [id number] - Age: 52year old - : 1970    Subjective:  States he is feeling well. Pain is controlled. No new complaints.        Medications:  • Pantoprazole S 129*   K  3.5  3.0*   --   4.2  3.5   CL  92*  90*   --    --   95   CO2  25  26   --    --   25   ALKPHO   --    --   186*   --    --    AST   --    --   21   --    --    ALT   --    --   17   --    --    BILT   --    --   1.0   --    --    TP   --    --

## 2018-04-20 NOTE — SIGNIFICANT EVENT
Puma saturates at 88% on room air at rest, and 84% on room air with ambulation. Saturates at 94% on 2L O2 while ambulating.

## 2018-04-20 NOTE — CM/SW NOTE
4/20CM the Patient's MD completed the DME Face to Face Form and the oxygen sats are charted. This Writer made a referral to [facility] ([phone] fax [phone]) and referral sent out referral documents.  This Writer informed the Patient's RN that it could

## 2018-04-20 NOTE — PROGRESS NOTES
Pulmonary Progress Note      NAME: Arlen Monsalve - ROOM: 614/812-H - MRN: [id number] - Age: 52year old - : 1970    Assessment/Plan:  1. Dyspnea and cough - concern for volume overload given CT findings. PNA is still in the ddx. No e/o PE.  Radiat and rhythm, normal S1S2, no murmur. Abdomen: soft, non-tender, non-distended, positive BS.    Extremity: no edema    Recent Labs   Lab  04/19/18   0515   RBC  2.90*   HGB  8.9*   HCT  26.1*   MCV  90.1   MCH  30.6   MCHC  34.0   RDW  18.6*   WBC  7.9   PL

## 2018-04-20 NOTE — PROGRESS NOTES
[facility]    Progress Note    Jarret Lung Patient Status:  Inpatient    1970 MRN [id number]   Location [facility] 4W/SW/SE Attending Ester Freeman MD   Hosp Day # 5 PCP Laya Harley MD       Subjective:   Karen Boucher 5 % 100 mL ADD-vantage 3.375 g Intravenous Q8H   PEG 3350 (MIRALAX) powder packet 17 g 17 g Oral Daily PRN   magnesium hydroxide (MILK OF MAGNESIA) 400 MG/5ML suspension 30 mL 30 mL Oral Daily PRN   bisacodyl (DULCOLAX) rectal suppository 10 mg 10 mg Recta Mri Brain (w+wo) (cpt=70553)    Result Date: 4/19/2018  CONCLUSION: No acute intracranial process. There are scattered T2/FLAIR signal changes within the cerebral white matter are which may represent sequela of prior inflammation or prior infection.   Rosemary Read

## 2018-04-20 NOTE — PROGRESS NOTES
[facility] Note: Route Optimization for Pantoprazole (PROTONIX)    Patient is currently on Pantoprazole (PROTONIX) 40 mg IV every 24 hours.    The patient meets the criteria to convert to the oral equivalent as established by the IV to Oral conversion pro

## 2018-04-21 NOTE — PROGRESS NOTES
Pulmonary Progress Note     Assessment / Plan:  1. Dyspnea and cough - concern for volume overload given CT findings. PNA is still in the ddx. No e/o PE. Radiation pneumonitis considered less likely  - repeat CXR  - may need thora.  Hold NOAC  - hold lasix

## 2018-04-21 NOTE — PROGRESS NOTES
Hematology/Oncology  Progress Note        NAME: Cathleen Reyes - ROOM: 374/048-C - MRN: [id number] - Age: 52year old - : 1970    Subjective:  More short of breath. cxr pending  Pain is not well controlled and hurts to cough  Wife at bedside. 3.5  3.6  3.6   CL  90*   --    --   95  98   CO2  26   --    --   25  24   ALKPHO   --   186*   --    --    --    AST   --   21   --    --    --    ALT   --   17   --    --    --    BILT   --   1.0   --    --    --    TP   --   6.3   --    --    --

## 2018-04-21 NOTE — PROGRESS NOTES
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   [facility] hospitalist to continue to follow patient while in house     Patient and/or patient's family given opportunity to ask questions and note understanding and agreeing with ther 04/19/18   0515  04/21/18   0544   WBC  7.9  7.5   HGB  8.9*  8.0*   MCV  90.1  89.7   PLT  326  251       Recent Labs   Lab  04/19/18   0515  04/19/18   1749  04/20/18   0513  04/21/18   0544   NA  129*   --   129*  131*   K  3.0*  4.2  3.5  3.6  3.6   CL

## 2018-04-22 NOTE — PROGRESS NOTES
Pulmonary Progress Note     Assessment / Plan:  1. Dyspnea and cough - concern for volume overload given CT findings. PNA is still in the ddx. No e/o PE. Radiation pneumonitis considered less likely  - repeat CXR c/w volume overload.  Bedside ultrasound wit

## 2018-04-22 NOTE — PROGRESS NOTES
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 intravasc depletion. Trial of lasix/albumin as above given significant resp symptoms.  If hr increases or becomes dehydrated may ha daily   • docusate sodium  100 mg Oral BID   • gabapentin  600 mg Oral TID   • Senna  8.6 mg Oral BID       ondansetron, Prochlorperazine Maleate, Prochlorperazine Edisylate, guaiFENesin-codeine, Metoclopramide HCl, Normal Saline Flush, acetaminophen, ipra

## 2018-04-23 NOTE — PROGRESS NOTES
Pulmonary Progress Note     Assessment / Plan:  1. Dyspnea and cough - concern for volume overload given CT findings. PNA is still in the ddx. No e/o PE.  Radiation pneumonitis considered less likely  - plan for diagnostic and therapeutic thoracentesis toda

## 2018-04-23 NOTE — PROCEDURES
Thoracentesis Procedure Note    Indication: Right pleural effusion    Pre-procedure: Risks, benefits, and alternatives were discussed with patient and they agreed with proceeding. Procedural pause performed.  Ultrasound was used to identify right pleural

## 2018-04-23 NOTE — PLAN OF CARE
GASTROINTESTINAL - ADULT    • Minimal or absence of nausea and vomiting Not Progressing    • Maintains adequate nutritional intake (undernourished) Not Progressing          DISCHARGE PLANNING    • Discharge to home or other facility with appropriate resour

## 2018-04-23 NOTE — PROGRESS NOTES
[facility] Hospital[facility] Progress Note     PCP: Saba Nichols MD    CC: Patient presents with:  Dyspnea KOBY SOB (respiratory)         Assessment/Plan:     Nikhil Gonzalez a 52year old male with PMH sig for stage IIIB (T2N2MX) adenocarcinoma of the right u early April  -6000cGY in 46 fractions; 11/13/17-12/29/17, cis/alimta on 11/10/17 and finished 2/2018  -started xrt for bone mets 4/2018  -plan in future Opdive/Xgeva  -follow up with onc,  Cont prior meds     Sinus tachycardia-several reasons to have-pain, times daily   • docusate sodium  100 mg Oral BID   • gabapentin  600 mg Oral TID   • Senna  8.6 mg Oral BID       ondansetron, Prochlorperazine Maleate, Prochlorperazine Edisylate, guaiFENesin-codeine, Metoclopramide HCl, Normal Saline Flush, acetaminophen at 9:04     Approved by (CST): Amy Fischer MD on 4/15/2018 at 9:11          Ct Brain Or Head (62393)    Result Date: 4/19/2018  PROCEDURE: CT BRAIN OR HEAD (CPT=70450)  COMPARISON: [facility], XR CHEST AP PORTABLE (CPT=71045), 4/18/2018, CEREBRUM: Nonspecific 2-3 mm diameter T2/FLAIR signal changes within the superficial and deep white matter of the cerebrum. The distribution is in a nonspecific pattern in these foci do not show enhancement, mass effect, or diffusion restriction.   There examination, the lowest fully formed disc space is designated as L5-S1. ALIGNMENT: There is preservation of the expected lumbar lordosis.  BONES: There are multiple marrow replacing foci throughout the lumbosacral spine that demonstrate concomitant postcont compression fracture. No evidence of distal thecal sac compromise. 3. Mild lower lumbar degenerative changes without significant resultant neural compromise. 4. Lesser incidental findings as above.      Dictated by (CST): Nannette Manriquez MD on 4/01/2018 at techniques for patient specific dose reduction were followed while maintaining the necessary diagnostic image quality. FINDINGS: HEAD/NECK: There are no hypermetabolic lymph nodes seen within the neck.  CHEST: Right apex neoplasm is decreased in size and le possibly inflammatory in etiology. 5. Moderately large bilateral pleural effusions are present. Ct Guidance Radiation Therapy Flds Placement (cpt=77014)    Result Date: 4/5/2018  CT GUIDANCE FOR RADIATION THERAPY. INDICATION: Metastatic lung cancer.  COM ill-defined additional areas of groundglass attenuation in the right lung. Consolidation in the superior segment of the right lower lobe is very similar, for instance on image 114.  An ill-defined density in the right upper lobe on image 117 is also not [place] opacification in the right lung, possibly sequela from radiation. There is also associated bronchial wall thickening. 3. Large bilateral pleural effusions. 4. Multiple lytic and blastic skeletal metastases.     Xr Chest Ap Portable  (cpt=71045)    Result Da 4/21/2018 at 15:37     Approved by (CST): Shady Jeong MD on 4/21/2018 at 15:40          Xr Chest Ap Portable  (cpt=71045)    Result Date: 4/18/2018  PROCEDURE: XR CHEST AP PORTABLE (CPT=71010) TIME: 9797.    COMPARISON: [facility], CT material.  Automated exposure control for dose reduction was used. Adjustment of the mA and/or kV was done based on the patient's size. Use of iterative reconstruction technique for dose reduction was used. FINDINGS:  CARDIAC: Small pericardial effusion. vertebral body lesion. Probable Schmorl's node along the superior endplate of Z68. Patchy sclerosis in T3 vertebral body. OTHER: Negative. CONCLUSION:  1. No evidence for pulmonary embolus.  2. CHF with pulmonary interstitial edema, and moderate bila stage IIIB lung cancer, with bone lesions suspicious for metastasis  FINDINGS: Following informed consent, the patient was placed prone, and the back was prepped and draped in sterile fashion.  The involved area of the L4 vertebral body was targeted for bio

## 2018-04-23 NOTE — DIETARY NOTE
ADULT NUTRITION REASSESSMENT     Pt is at high nutrition risk. Pt meets malnutrition criteria.       CRITERIA FOR MALNUTRITION DIAGNOSIS:  Criteria for severe malnutrition diagnosis: chronic illness related to wt loss greater than 10% in 6 months, energy neoplasm of lung metastatic to other site, unspecified laterality ([facility] [C34.90]  Acute bronchitis, unspecified organism [J20.9]  Sepsis due to pneumonia ([facility] [J18.9, A41.9]    PERTINENT PAST MEDICAL HISTORY:   Past Medical History:   Diagnosis Date   • A • docusate sodium  100 mg Oral BID   • gabapentin  600 mg Oral TID   • Senna  8.6 mg Oral BID       LABS: reviewed    Recent Labs   04/21/18  0544 04/22/18  0538 04/23/18  0434   * 96 112*   BUN 12 12 12   CREATSERUM 1.24 1.18 1.06   CA 8.7 8.8 9.

## 2018-04-23 NOTE — PAYOR COMM NOTE
REQUESTING ADDITIONAL DAYS.     FOR 4/20-4/23    CONTINUED STAY REVIEW    Payor: Thanh Boyer  #:  [id number]  Authorization Number: Latrice Florencias date: 4/15/18  Admit time: Amy Paeg 1527    Admitting Physician: Aide Howard MD  Attending Ph Prochlorperazine Maleate (COMPAZINE) tab 5 mg     Date Action Dose Route User    4/23/2018 1011 Given 5 mg Oral Carolyn Scales, RN      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      Medications:  • sodium chloride 0.9%  500 mL Intravenous Once   • Pantoprazole Sodium  40 mg Oral QAM AC   • predniSONE  20 mg Oral Daily with breakfast   • ipratropium-albuterol  3 mL N ALKPHO   --   186*   --    --    --    AST   --   21   --    --    --    ALT   --   17   --    --    --    BILT   --   1.0   --    --    --    TP   --   6.3   --    --    --          Assessment/Plan:  Mr. Radha Castro is a 52year old gentleman with metastatic ad Subjective:  Dyspnea is slightly improved today     Objective:  Vitals     04/21/18  1320 04/21/18  2107 04/21/18  2143 04/22/18  0435   BP: 99/57   127/82 130/81   BP Location: Right arm   Right arm Right arm   Pulse: 128   115 114   Resp: 20 20   20   Te Acute hypoxic respiratory failure-suspect multifactorial, pna, pulmonary edema, lung cancer, atelectasis  -completed 7d zosyn  -s/p diuretics but then developed rika and orthostasis  -still w/ significant pulm edema, likely all third spacing, Normal ef on e Janese Hatchet, DO  [facility] Hospitalist  Answering Service: [phone]           Subjective      Patient is seen and examined at bedside today after thoracentesis by pulm. He is somewhat lethargic and with some pleuritic chest pain but otherwise feeling ok.  Less CO2  24  23  26   BUN  12  12  12   CREATSERUM  1.24  1.18  1.06   CA  8.7  8.8  9.0   GLU  115*  96        4/23 PROCEDURE  Thoracentesis Procedure Note     Indication: Right pleural effusion

## 2018-04-24 NOTE — PROCEDURES
Thoracentesis Note    Procedure: left thoracentesis  Indication: Pleural effusion  Surgeon: Varghese Zhao MD    Informed consent was obtained. The patient was brought to the ultrasound department, seated upright. Time out performed.  The left posterior

## 2018-04-24 NOTE — PROGRESS NOTES
Pulmonary Progress Note      NAME: Caryle Crown - ROOM: 177/614-A - MRN: [id number] - Age: 52year old - : 1970    Assessment/Plan:  1. Dyspnea and cough - concern for volume overload given CT findings. PNA is still in the ddx. No e/o PE.  Radiat rate and rhythm, normal S1S2, no murmur. Abdomen: soft, non-tender, non-distended, positive BS.    Extremity: no edema    Recent Labs   Lab  04/24/18   0538   RBC  2.82*   HGB  8.6*   HCT  25.5*   MCV  90.5   MCH  30.4   MCHC  33.6   RDW  18.6*   WBC  7.4

## 2018-04-24 NOTE — PAYOR COMM NOTE
4/24 TRANSFERRED TO TELEMETRY    CONTINUED STAY REVIEW    Payor: Thanh Boyer  #:  [id number]  Authorization Number: Nanine Huguenin date: 4/15/18  Admit time: Amy Page 1527    Admitting Physician: Laquetta Paget, MD  Attending Physician:  Saulo Nicole chloride 0.9 % 250 mL IVPB     Date Action Dose Route User    4/23/2018 1632 New Bag 40 mEq Intravenous Nneka Crawford, MARI      [facility]) tab TABS 8.6 mg     Date Action Dose Route User    4/24/2018 0849 Given 8.6 mg Oral Dudley Woodard RN 1445 ml   Output             1200 ml   Net              245 ml      Physical Exam:  /72 (BP Location: Right arm)   Pulse 134   Temp 98.3 °F (36.8 °C) (Oral)   Resp 20   Ht 6' 7\" (2.007 m)   Wt 173 lb (78.5 kg)   SpO2 92%   BMI 19.49 kg/m² Net              245 ml      Physical Exam:  /72 (BP Location: Right arm)   Pulse 134   Temp 98.3 °F (36.8 °C) (Oral)   Resp 20   Ht 2.007 m (6' 7\")   Wt 78.5 kg (173 lb)   SpO2 93%   BMI 19.49 kg/m²   Physical Exam:                General: alert, [phone]) and referral sent out referral documents.  This Writer informed the Patient's RN that it could take up to 4-6 hours to process the orders and to deliver the portable tank to the Patient's room.          4/24 HOSPITALIST PROGRESS NOTE  Pascual Calhoun chronic. Stable. outpt f/u     Anemia, chronic 2/2 chemo/malignancy. Stable.  outpt f/u     Stage IIIB (T2N2MX) adenocarcinoma of the right upper lobe s/p concurrent chemo/XRT  -diffuse bone mets noted in early April  -6000cGY in 46 fractions; 11/13/17-12/2

## 2018-04-24 NOTE — PLAN OF CARE
GASTROINTESTINAL - ADULT    • Maintains adequate nutritional intake (undernourished) Not Progressing        RESPIRATORY - ADULT    • Achieves optimal ventilation and oxygenation Not Progressing          DISCHARGE PLANNING    • Discharge to home or other fa

## 2018-04-24 NOTE — PROGRESS NOTES
[facility] Hospitalist Progress Note     PCP: Mary Curry MD    CC: Patient presents with:  Dyspnea KOBY SOB (respiratory)         Assessment/Plan:     Jordyn Ford a 52year old male with PMH sig for stage IIIB (T2N2MX) adenocarcinoma of the right uppe IIIB (T2N2MX) adenocarcinoma of the right upper lobe s/p concurrent chemo/XRT  -diffuse bone mets noted in early April  -6000cGY in 46 fractions; 11/13/17-12/29/17, cis/alimta on 11/10/17 and finished 2/2018  -started xrt for bone mets 4/2018  -plan in fut 45 mg Oral 2 times per day   • Pantoprazole Sodium  40 mg Oral QAM AC   • ipratropium-albuterol  3 mL Nebulization 2 times daily   • docusate sodium  100 mg Oral BID   • gabapentin  600 mg Oral TID   • Senna  8.6 mg Oral BID       ondansetron, Prochlorpera atherosclerotic vascular calcification. The pulmonary vascularity is within normal limits. MEDIAST/LI: Distortion in the right perihilar region.  LUNGS/PLEURA: Left basilar airspace disease with blunting of the left costophrenic angle, similar in comparis lymphadenopathy. 3. Partial obscuration of known right upper lobe malignancy.      Dictated by (CST): Bonnie Chiang MD on 4/15/2018 at 9:04     Approved by (CST): Bonnie Chiang MD on 4/15/2018 at 838 Gaye Wilber ([id number])    Result Date: 4/19 for visualization of suspected pathology. Images were performed without and with gadolinium contrast.   FINDINGS:  CEREBRUM: Nonspecific 2-3 mm diameter T2/FLAIR signal changes within the superficial and deep white matter of the cerebrum.   The distributio the administration of intravenous gadolinium contrast.   FINDINGS:  NUMERATION: For the purposes of this examination, the lowest fully formed disc space is designated as L5-S1. ALIGNMENT: There is preservation of the expected lumbar lordosis.  BONES: There traversing left S1 and S2 nerve roots at the sacral ala. 2. No definite associated acute lumbar spine compression fracture. No evidence of distal thecal sac compromise.  3. Mild lower lumbar degenerative changes without significant resultant neural compromi purposes only (not for diagnostic interpretation). Automated exposure control and ALARA manual techniques for patient specific dose reduction were followed while maintaining the necessary diagnostic image quality.  FINDINGS: HEAD/NECK: There are no hypermet gland metastases. 4. There are nondescript densities and nodular opacities in the right lower lobe, possibly inflammatory in etiology. 5. Moderately large bilateral pleural effusions are present.     Ct Guidance Radiation Therapy Flds Placement (cpt=77014) measures approximately 1.6 x 1.2 cm in size, slightly smaller than on the most recent PET/CT. There are ill-defined additional areas of groundglass attenuation in the right lung.  Consolidation in the superior segment of the right lower lobe is very similar shrinkage of the right apical spiculated nodule. 2. Scattered areas of additional parenchymal opacification in the right lung, possibly sequela from radiation. There is also associated bronchial wall thickening. 3. Large bilateral pleural effusions.  4. Mul (CPT=71045), 4/21/2018, 14:37. INDICATIONS: Post right thoracentesis. TECHNIQUE:   Single view. CONCLUSION:   * No pneumothorax post right thoracentesis. * Improved aeration of the right lung base.  * Consolidation/atelectasis left base with bluntin INDICATIONS: Hypoxia, shortness of breath, and nausea. TECHNIQUE:   Single view. FINDINGS:  CARDIAC/VASC: The cardiomediastinal silhouette is not enlarged. The pulmonary vascularity is within normal limits. MEDIAST/LI: No visible mass or adenopathy.  L No evidence for pulmonary embolus. AORTA: Normal.  No aneurysm or dissection. LUNGS/PLEURA: 1.6 x 1.2 cm spiculated density in the right lung apex is less well-defined than on the previous exams, but otherwise unchanged.  A anterior right ankle 2 cm region change. 4. Newly developed a 5 cm right perihilar lower lobe mass or masslike region of consolidation. 5. Other smaller new pulmonary nodules as described. 6. Osseous metastases including a large lytic T12 vertebral body lesion-unchanged.  7. Small pericard of the vertebral body. An 11 gauge bone biopsy was advanced coaxially, and 3 passes were made, core and bloody aspirate samples  obtained for pathology. Adequate tissue confirmed by cytology.   The patient tolerated the procedure well, without immediate com

## 2018-04-24 NOTE — PROGRESS NOTES
Hematology/Oncology  Progress Note        NAME: Stephen Holt - ROOM: 682/729-K - MRN: [id number] - Age: 52year old - : 1970    Subjective:  More short of breath. cxr pending  Pain is not well controlled and hurts to cough  Wife at bedside. angiogram negative for VTE  - on Abx for possible pneumonia  - normal ECHO  - pulm on board   - s/p right thoracentesis yesterday with 1.6 L  Of fluid removed  - plan for left thora today     Lung cancer  - Metastatic to bone  - Will resume palliative XRT

## 2018-04-25 NOTE — PROGRESS NOTES
Pulmonary Progress Note     Assessment / Plan:  1. Dyspnea and cough - concern for volume overload given CT findings. PNA is still in the ddx. No e/o PE.  Radiation pneumonitis considered less likely  - s/p bilateral thora with 1.6L removed on both sides wi

## 2018-04-25 NOTE — PROGRESS NOTES
[facility] Hospitalist Progress Note     PCP: Rochelle Felder MD    CC: Patient presents with:  Dyspnea KOBY SOB (respiratory)         Assessment/Plan:       Leia Gary a 52year old male with PMH sig for stage IIIB (T2N2MX) adenocarcinoma of the right up early April  -6000cGY in 46 fractions; 11/13/17-12/29/17, cis/alimta on 11/10/17 and finished 2/2018  -started xrt for bone mets 4/2018  -plan in future Opdive/Xgeva  -follow up with onc,  Cont prior meds        FEN  -diet as tolerated     CODE: FULL     Nebulization 2 times daily   • docusate sodium  100 mg Oral BID   • gabapentin  600 mg Oral TID   • Senna  8.6 mg Oral BID       Metoclopramide HCl, benzonatate, ondansetron, Prochlorperazine Maleate, Prochlorperazine Edisylate, guaiFENesin-codeine, Metocl calcification. The pulmonary vascularity is within normal limits. MEDIAST/LI: Distortion in the right perihilar region. LUNGS/PLEURA: Left basilar airspace disease with blunting of the left costophrenic angle, similar in comparison to previous day.  Mild obscuration of known right upper lobe malignancy.      Dictated by (CST): Toribio Negrete MD on 4/15/2018 at 9:04     Approved by (CST): Toribio Negrete MD on 4/15/2018 at 838 [facility] ([id number])    Result Date: 4/19/2018  PROCEDURE: CT BRAIN O pathology. Images were performed without and with gadolinium contrast.   FINDINGS:  CEREBRUM: Nonspecific 2-3 mm diameter T2/FLAIR signal changes within the superficial and deep white matter of the cerebrum.   The distribution is in a nonspecific pattern i intravenous gadolinium contrast.   FINDINGS:  NUMERATION: For the purposes of this examination, the lowest fully formed disc space is designated as L5-S1. ALIGNMENT: There is preservation of the expected lumbar lordosis.  BONES: There are multiple marrow re nerve roots at the sacral ala. 2. No definite associated acute lumbar spine compression fracture. No evidence of distal thecal sac compromise. 3. Mild lower lumbar degenerative changes without significant resultant neural compromise.  4. Lesser incidental f diagnostic interpretation). Automated exposure control and ALARA manual techniques for patient specific dose reduction were followed while maintaining the necessary diagnostic image quality.  FINDINGS: HEAD/NECK: There are no hypermetabolic lymph nodes seen are nondescript densities and nodular opacities in the right lower lobe, possibly inflammatory in etiology. 5. Moderately large bilateral pleural effusions are present.     Ct Guidance Radiation Therapy Flds Placement (cpt=77014)    Result Date: 4/5/2018  C cm in size, slightly smaller than on the most recent PET/CT. There are ill-defined additional areas of groundglass attenuation in the right lung. Consolidation in the superior segment of the right lower lobe is very similar, for instance on image 114.  An i spiculated nodule. 2. Scattered areas of additional parenchymal opacification in the right lung, possibly sequela from radiation. There is also associated bronchial wall thickening. 3. Large bilateral pleural effusions.  4. Multiple lytic and blastic skelet ONLY) EM, 4/15/2018, 17:26. [facility], XR CHEST AP PORTABLE (CPT=71045), 4/23/2018, 12:15. INDICATIONS: Post right thoracentesis. Stage IV lung cancer. Pleural effusions. TECHNIQUE:   Single view.    FINDINGS:  CARDIAC/VASC: Heart size n Date: 4/21/2018  PROCEDURE: XR CHEST AP PORTABLE (CPT=71010) TIME: 1439   COMPARISON: [facility], CT CHEST PAIN/PE (IV ONLY) EM, 4/15/2018, 17:26. [facility], XR CHEST AP PORTABLE (CPT=71045), 4/18/2018, 6:36.   INDICATIONS: Pulmonary interstitial opacities are present. No pneumothorax is evident. BONES: Known osseous metastatic disease is not well-visualized by radiographic technique. . OTHER: Leads overlie the chest and obscure underlying detail. CONCLUSION:  1.  Exten consolidation in the right lower lobe/infrahilar region. A 1 cm perifissural right lower lobe nodule image 102 series 4 was not present with certainty on the previous exam. A 7 mm peripheral right lower lobe nodule image 132 series 4 is unchanged.  A 12 mm provider. Us Thoracentesis Guided Right (cpt=32555)    Result Date: 4/23/2018  PROCEDURE: US THORACENTESIS GUIDED RIGHT (CPT=32555)  COMPARISON: [facility], CT CHEST PAIN/PE (IV ONLY) EM, 4/15/2018, 17:26.   [facility], XR Luis Hernandez MD on 4/04/2018 at 17:18

## 2018-04-25 NOTE — PLAN OF CARE
Problem: Patient/Family Goals  Goal: Patient/Family Long Term Goal  Patient's Long Term Goal: Return home upon discharge    Interventions:  - See additional Care Plan goals for specific interventions    Outcome: Progressing    Goal: Patient/Family Short Te

## 2018-04-26 NOTE — PROGRESS NOTES
Pulmonary Progress Note      NAME: Abdi Florian - ROOM: 306/306-A - MRN: [id number] - Age: 52year old - : 1970    Assessment/Plan:  1. Dyspnea and cough - concern for volume overload given CT findings. PNA is still in the ddx. No e/o PE.  Radiat wall: No tenderness or deformity. Heart: Regular rate and rhythm, normal S1S2, no murmur. Abdomen: soft, non-tender, non-distended, positive BS.    Extremity: no edema    Recent Labs   Lab  04/25/18   0635   RBC  2.77*   HGB  8.1*   HCT  24.8*   MCV  89

## 2018-04-26 NOTE — PROGRESS NOTES
Hematology/Oncology  Progress Note        NAME: Bushra Dominguez - ROOM: 038/882-N - MRN: [id number] - Age: 52year old - : 1970    Subjective   On tele floor for tachycardia.    Feeling nauseated, having trouble taking po     Medications:  • furosem pneumonia  - normal ECHO  - pulm on board   - s/p right and left thora    Tachycardia  - on tele floor due to tachycardia   - ctm      Lung cancer  - Metastatic to bone  - Will resume palliative XRT to spine as outpatient  - Plan to start Salma Landry as

## 2018-04-26 NOTE — PAYOR COMM NOTE
CONTINUED STAY REVIEW    Payor: [address] [place] [address] #:  [id number]  Authorization Number: [place] date: 4/15/18  Admit time: Amy Page 1527    Admitting Physician: Isabel Wallace MD  Attending Physician:  Bobby Gregg MD  Primary Care y ondansetron HCl (ZOFRAN) injection 4 mg     Date Action Dose Route User    4/26/2018 1333 Given 4 mg Intravenous Carmen Palacio RN    4/26/2018 0548 Given 4 mg Intravenous Eliseo Thorpe RN Senna (SENOKOT) tab TABS 8.6 mg     Date Action Effie Mock then developed rika and orthostasis  -still w/ significant pulm edema, likely all third spacing, Normal ef on echo; is s/p lasix and albumin yesterday   -steroids discontinued  -was on anticoagulation, cta was neg for pe   -pulm on c/s.staged bilateral thor °C)-98.8 °F (37.1 °C)] 98.1 °F (36.7 °C)  Pulse:  [120-129] 120  Resp:  [20] 20  BP: (104-126)/(70-86) 113/73    Medications      • furosemide  20 mg Intravenous Once   • morphINE Sulfate ER  45 mg Oral 2 times per day   • Pantoprazole Sodium  40 mg Oral Q [facility]        Subjective:  No acute events overnight.  Remains intermittently dyspneic.      Medications:  • apixaban  5 mg Oral 2 times per day   • furosemide  20 mg Intravenous Once   • morphINE Sulfate ER  45 mg Oral 2 times per d cancer, atelectasis, pleural effusion  -completed 7d zosyn  -s/p diuretics but then developed rika and orthostasis  -still w/ significant pulm edema, likely all third spacing, Normal ef on echo; is s/p lasix and albumin   -steroids discontinued  -was on ant counseling patient face to face     Subjective      Feels like breathing is slightly better each day since thoracentesis. Asking when he can go home. Still intermittent N/V but better with scheduled antiemetics.  No CP, does notice tachycardia.      Objecti PLT  256  238              Recent Labs   Lab  04/24/18   0538  04/25/18   0635   NA  130*  127*   K  4.3  4.3  4.5   CL  98  93*   CO2  24  27   BUN   --   14   CREATSERUM   --   1.19   CA   --   8.5   GLU   --   126*

## 2018-04-26 NOTE — PLAN OF CARE
Problem: Patient/Family Goals  Goal: Patient/Family Short Term Goal  Patient's Short Term Goal: Remain free of SOB    Interventions:   - Monitor O2 status  - Solumedrol and duonebs  - See additional Care Plan goals for specific interventions    Outcome: No oxygen saturation or ABGs  - Provide Smoking Cessation handout, if applicable  - Encourage broncho-pulmonary hygiene including cough, deep breathe, Incentive Spirometry  - Assess the need for suctioning and perform as needed  - Assess and instruct to repor environment   Outcome: Not Progressing  Poor appetite

## 2018-04-26 NOTE — PROGRESS NOTES
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 Stable. outpt f/u     Anemia, chronic 2/2 chemo/malignancy. Stable.  outpt f/u     Stage IIIB (T2N2MX) adenocarcinoma of the right upper lobe s/p concurrent chemo/XRT  -diffuse bone mets noted in early April  -6000cGY in 46 fractions; 11/13/17-12/2 Oral 2 times per day   • furosemide  20 mg Intravenous Once   • morphINE Sulfate ER  45 mg Oral 2 times per day   • Pantoprazole Sodium  40 mg Oral QAM AC   • ipratropium-albuterol  3 mL Nebulization 2 times daily   • docusate sodium  100 mg Oral BID   • g enlarged. There is mild tortuosity of the thoracic aorta with peripheral atherosclerotic vascular calcification. The pulmonary vascularity is within normal limits. MEDIAST/LI: Distortion in the right perihilar region.  LUNGS/PLEURA: Left basilar airspace bibasilar pleural effusions. 2. Right hilar prominence possibly representing lymphadenopathy. 3. Partial obscuration of known right upper lobe malignancy.      Dictated by (CST): Tami Echeverria MD on 4/15/2018 at 9:04     Approved by (CST): Veronica Cheek mets  TECHNIQUE: A variety of imaging planes and parameters were utilized for visualization of suspected pathology.   Images were performed without and with gadolinium contrast.   FINDINGS:  CEREBRUM: Nonspecific 2-3 mm diameter T2/FLAIR signal changes with visualization of suspected pathology. Images were performed before and after the administration of intravenous gadolinium contrast.   FINDINGS:  NUMERATION: For the purposes of this examination, the lowest fully formed disc space is designated as L5-S1.  A left sacrum at the S2 vertebral body level. This lesion mildly encroaches the traversing left S1 and S2 nerve roots at the sacral ala. 2. No definite associated acute lumbar spine compression fracture. No evidence of distal thecal sac compromise.  3. Mild l 96 dL/mg. The CT portion of the exam was performed for attenuation correction purposes only (not for diagnostic interpretation).  Automated exposure control and ALARA manual techniques for patient specific dose reduction were followed while maintaining the avid metastatic intrathoracic/abdominal pelvic lymph nodes or hepatic/adrenal gland metastases. 4. There are nondescript densities and nodular opacities in the right lower lobe, possibly inflammatory in etiology.  5. Moderately large bilateral pleural effus parenchyma demonstrates the spiculated mass in the right apex currently measures approximately 1.6 x 1.2 cm in size, slightly smaller than on the most recent PET/CT. There are ill-defined additional areas of groundglass attenuation in the right lung.  Conso are also present in the bony pelvis. IMPRESSION: 1. Slight interval shrinkage of the right apical spiculated nodule. 2. Scattered areas of additional parenchymal opacification in the right lung, possibly sequela from radiation.  There is also associated PORTABLE (CPT=71010) TIME: 1604 hrs. COMPARISON: [facility], CT CHEST PAIN/PE (IV ONLY) EM, 4/15/2018, 17:26. [facility], XR CHEST AP PORTABLE (CPT=71045), 4/23/2018, 12:15. INDICATIONS: Post right thoracentesis.  Stage I Darrell Block MD on 4/23/2018 at 12:46          Xr Chest Ap Portable  (cpt=71045)    Result Date: 4/21/2018  PROCEDURE: XR CHEST AP PORTABLE (CPT=71010) TIME: 1439   COMPARISON: [facility], CT CHEST PAIN/PE (IV ONLY) EM, 4/15/2018, 17:2 disease is present in the left upper lobe. Layering bilateral pleural effusions are noted. Extensive Pulmonary interstitial opacities are present. No pneumothorax is evident.   BONES: Known osseous metastatic disease is not well-visualized by radiographic t right upper lobe. Thickening of central bronchi in both lungs. A 5 cm mass or masslike region of consolidation in the right lower lobe/infrahilar region.  A 1 cm perifissural right lower lobe nodule image 102 series 4 was not present with certainty on the p 21:06          Radiology Result Scan    Result Date: 4/16/2018  Ordered by an unspecified provider.     Us Thoracentesis Guided Right (cpt=32555)    Result Date: 4/23/2018  PROCEDURE: US THORACENTESIS GUIDED RIGHT (CPT=32555)  COMPARISON: [facility] Dictated by (CST): Stephan Cross MD on 4/04/2018 at 17:12     Approved by (CST): Stephan Cross MD on 4/04/2018 at 17:18

## 2018-04-27 PROBLEM — G89.3 PAIN, NEOPLASM-RELATED: Status: ACTIVE | Noted: 2018-01-01

## 2018-04-27 PROBLEM — M54.6 ACUTE RIGHT-SIDED THORACIC BACK PAIN: Status: ACTIVE | Noted: 2018-01-01

## 2018-04-27 PROBLEM — R06.02 SHORTNESS OF BREATH: Status: ACTIVE | Noted: 2018-01-01

## 2018-04-27 NOTE — PROGRESS NOTES
ASSESSMENT/PLAN:    Impression: 1. Sinus tachycardia in a 52year-old with multiple issues including metastatic lung cancer, anemia, pain from his meds. He does not appear to have any structural cardiac issues.   I suspect this is a physiologic respons 10mg x 3 days Disp: 9 tablet Rfl: 0      Past Medical History:   Diagnosis Date   • Agatston coronary artery calcium score less than 100 2014 2014 score zero   • Childhood asthma     Out grew sx in adolescence   • Elevated LDL cholesterol level    • Tanya CAROTIDS:carotid pulses normal. ABD AORTA:not enlarged. FEM:femoral pulses intact. PEDAL:pedal pulses intact. EXT:no peripheral edema.       LABORATORY DATA:   ekg sinus tach without acute change  Labs reviewed:    Arielle Bardales MD

## 2018-04-27 NOTE — PROGRESS NOTES
[facility] Hospitalist Progress Note     PCP: Ramandeep Montes De Oca MD    CC: Patient presents with:  Dyspnea KOBY SOB (respiratory)     Assessment/Plan:     Laya Gipson a 52year old male with PMH sig for stage IIIB (T2N2MX) adenocarcinoma of the right upper lo ST, cardiology consulted, do not feel he has Afib  - started on PO metoprolol Q6hr     Severe low back pain due to bone mets  -s/p 4 of 14 doses of XRT  -on hold while in house  -cont home prn MS contin and IR-->doses increased per heme/onc  - palliative c place  Neck: Supple   Pulm: clear, diminished at bases, no increased work of breathing  CV: tachycardic, regular rhythm, no murmurs  ABD: Soft, non-tender, non-distended, +BS  Neuro: Grossly normal, CN intact, sensory intact  Psych: Affect- normal  SKIN: w AP PORTABLE (CPT=71045), 4/23/2018, 16:01. INDICATIONS: Shortness of breath and tachycardia. History of lung cancer. TECHNIQUE:   Single PA view. FINDINGS: COMMENT: Overlying wires and treatment artifacts obscure fine detail.  CARDIAC/VASC: The cardiome pronounced in the lower lungs. Biapical pleural thickening. Questionable visualization of a partially obscured known right upper lobe lesion. BONES: No fracture or visible bony lesion. OTHER: Negative. CONCLUSION:  1.  CHF/fluid overload with pulmonar MD on 4/19/2018 at 17:24     Approved by (CST): Kimberly Grace MD on 4/19/2018 at 17:29          Mri Brain (w+wo) (cpt=70553)    Result Date: 4/19/2018  PROCEDURE: MRI BRAIN (W+WO) (CPT=70553)  COMPARISON: None.   INDICATIONS: remote lacunar infarct, hx of me 4/1/2018  PROCEDURE: MRI SPINE LUMBAR (W+WO) (CPT=72158)  COMPARISON: None.   INDICATIONS: severe sudden onset low back pain, hx lung CA, with possible metastatic disease  TECHNIQUE: A comprehensive examination was performed utilizing a variety of imaging p CONCLUSION:  1. Innumerable marrow replacing foci throughout the lumbosacral spine that demonstrate concomitant postcontrast enhancement, compatible with multifocal osseous metastatic disease. There are also metastases within both iliac bones.  The larg TECHNIQUE:  PET-CT imaging was performed after IV administration of 12.962 mCi of F-18 FDG, with an uptake time of 90 minutes. Tomographic images were obtained from the orbits through the thighs, with the patient in the supine position.   The patient's blo ribs, humeri and femurs. The T12 metastasis is purely lytic. Some metastases are sclerotic and other bone metastases are mixed lytic and sclerotic. IMPRESSION: 1. A right apex neoplasm is decreased in size and SUV max.  2. There are multiple new FDG avid image quality. ADVERSE REACTION: None. FINDINGS:  HEART/THORACIC AORTA: The heart is normal in size and shape and the aorta is grossly normal in course and caliber.      MEDIASTINUM/LI: No abnormally enlarged lymph nodes are appreciated in the chest. PULM evidence of mesenteric, retroperitoneal or inguinal adenopathy. BOWEL: The sigmoid colon is incompletely distended. The appendix looks unremarkable. There is no free air or significant free fluid.  OSSEOUS STRUCTURES: There are numerous lytic and blastic sk earlier the same day. * Heart size appears normal. * Right lung is stable. Dictated by (CST): Virginia Evans MD on 4/24/2018 at 14:09     Approved by (CST):  Virginia Evans MD on 4/24/2018 at 14:10          Xr Chest Ap Portable  (cpt=71045)    R view.       CONCLUSION:   * No pneumothorax post right thoracentesis. * Improved aeration of the right lung base. * Consolidation/atelectasis left base with blunting of costophrenic angle. * Stable heart size. Dictated by (CST):  Deangelo Aparicio MD CARDIAC/VASC: The cardiomediastinal silhouette is not enlarged. The pulmonary vascularity is within normal limits. MEDIAST/LI: No visible mass or adenopathy.  LUNGS/PLEURA: Multifocal masslike patchy airspace consolidation is demonstrated the right upper LUNGS/PLEURA: 1.6 x 1.2 cm spiculated density in the right lung apex is less well-defined than on the previous exams, but otherwise unchanged.  A anterior right ankle 2 cm region of airspace opacification, and patchy regions of airspace opacification in the region of consolidation. 5. Other smaller new pulmonary nodules as described. 6. Osseous metastases including a large lytic T12 vertebral body lesion-unchanged. 7. Small pericardial effusion.  1.    Dictated by (CST): Rosa Beal MD on 4/15/2018 at 20:43 passes were made, core and bloody aspirate samples  obtained for pathology. Adequate tissue confirmed by cytology. The patient tolerated the procedure well, without immediate complications and was transferred to recovery in stable condition.       Brianne Guerra

## 2018-04-27 NOTE — PROGRESS NOTES
Pulmonary Progress Note      NAME: Radha Hameed - ROOM: 579/297-N - MRN: [id number] - Age: 52year old - : 1970    Assessment/Plan:  1. Dyspnea and cough - concern for volume overload given CT findings. PNA is still in the ddx. No e/o PE.  Radiat distress. HEENT: Normocephalic atraumatic. Lips, mucosa, and tongue normal.  No thrush noted. Lungs: Diminished on the left   Chest wall: No tenderness or deformity. Heart: Regular rate and rhythm, normal S1S2, no murmur.    Abdomen: soft, non-tender,

## 2018-04-27 NOTE — CONSULTS
Carmella 169 T Dev Patient Status:  Inpatient    1970 MRN [id number]   Location [facility] 4W/SW/SE Attending Jessi Gamboa MD   Hosp Day # 15 PCP Corrinne Endow, MD     Date of nausea started around the time he was initially diagnosed with cancer and then received chemotherapy. His nausea persisted even after chemotherapy was stopped. Last time received was around February. He denies feeling constipation.  His last BM was 2 day used to smoke 1/2 ppd quit in 2011  Hx of Substance Use/Abuse: 4 beers per week    Sikh/Cultural Information  Sikh Affiliation: spiritual and open to talking with chaplains    Allergies:  No Known Allergies    Medications:     Current Facility-Ad Oral Tab Take 20mg x 3 days, then 10mg x 3 days       Review of Systems:  Pertinent items are noted in HPI      Hematology:  Lab Results  Component Value Date   WBC 7.6 04/27/2018   HGB 8.1 (L) 04/27/2018   HCT 24.3 (L) 04/27/2018    04/27/2018 apparent respiratory distress especially after coughing which developed severe pain. Calmed down after pain decreased. HEENT: No focal deficits. Neurologic: Alert and awake, pleasant, able to engage in discussions and answering questions  Psychiatric:  Humberto Weathers management and patient told me that he has severe low back pain and dyspnea as well as nausea despite being on Zofran 4 mg Q 6 prn and Morphine CR and IR as per above.  I calculated the amount he needed on two separate days (once requiring 3 doses at 30 mg

## 2018-04-27 NOTE — CM/SW NOTE
4/27CM [address] ([phone], fax [phone]) has delivered the portable tank to the Patient at bedside. [address] informed the Patient that he will have to contact them when he discharges to set up home oxygen.      -[facility] [id number]

## 2018-04-27 NOTE — DIETARY NOTE
ADULT NUTRITION REASSESSMENT     Pt is at high nutrition risk. Pt meets malnutrition criteria.       CRITERIA FOR MALNUTRITION DIAGNOSIS:  Criteria for severe malnutrition diagnosis: chronic illness related to wt loss greater than 10% in 6 months, energy nutrition care to new setting or provider: monitor plans    ADMITTING DIAGNOSIS:   Hypoxia [R09.02]  Primary malignant neoplasm of lung metastatic to other site, unspecified laterality (HCC) [C34.90]  Acute bronchitis, unspecified organism [J20.9]  Sepsis vomiting. FOOD/NUTRITION RELATED HISTORY:  Appetite: Poor  Intake: small intake.       Intake Meeting Needs: No    Food Allergies: No  Cultural/Ethnic/Sabianism Preferences: None    MEDICATIONS: reviewed  • ondansetron HCl  8 mg Intravenous 6x daily

## 2018-04-28 NOTE — PLAN OF CARE
GASTROINTESTINAL - ADULT    • Minimal or absence of nausea and vomiting Not Progressing        RESPIRATORY - ADULT    • Achieves optimal ventilation and oxygenation Not Progressing          CARDIOVASCULAR - ADULT    • Maintains optimal cardiac output and h

## 2018-04-28 NOTE — PROGRESS NOTES
Pulmonary Progress Note        NAME: Cherie Jang - ROOM: 926/241-O - MRN: [id number] - Age: 52year old - : 1970    Past Medical History:   Diagnosis Date   • Agatston coronary artery calcium score less than 100 2014 score zero   • Chil >60   CA  8.4*  8.4*   NA  127*  128*   K  3.8  4.5   CL  91*  94*   CO2  24  26       Lab Results  Component Value Date   INR 0.98 11/22/2017   INR 0.97 11/21/2017           ASSESSMENT/PLAN:    1.  Dyspnea and cough - due to cancer, pleural effusions and r

## 2018-04-28 NOTE — PROGRESS NOTES
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 [facility], XR CHEST AP PORTABLE (CPT=71045), 4/23/2018, 16:01. INDICATIONS: Shortness of breath and tachycardia. History of lung cancer. TECHNIQUE:   Single PA view.    FINDINGS: COMMENT: Overlying wires and treatment a effusions. Bilateral interstitial prominence most pronounced in the lower lungs. Biapical pleural thickening. Questionable visualization of a partially obscured known right upper lobe lesion. BONES: No fracture or visible bony lesion. OTHER: Negative. evaluate.        Dictated by (CST): Mele Christensen MD on 4/19/2018 at 17:24     Approved by (CST): Mele Christensen MD on 4/19/2018 at 17:29          Mri Brain (w+wo) (cpt=70553)    Result Date: 4/19/2018  PROCEDURE: MRI BRAIN (W+WO) (CPT=70553)  COMPARISON: None (w+wo) (cpt=72158)    Result Date: 4/1/2018  PROCEDURE: MRI SPINE LUMBAR (W+WO) (CPT=72158)  COMPARISON: None.   INDICATIONS: severe sudden onset low back pain, hx lung CA, with possible metastatic disease  TECHNIQUE: A comprehensive examination was perform neural foraminal compromise. CONCLUSION:  1. Innumerable marrow replacing foci throughout the lumbosacral spine that demonstrate concomitant postcontrast enhancement, compatible with multifocal osseous metastatic disease.  There are also metastases wit 3/6/2018. FDG PET/CT 9/28/2017. TECHNIQUE:  PET-CT imaging was performed after IV administration of 12.962 mCi of F-18 FDG, with an uptake time of 90 minutes.   Tomographic images were obtained from the orbits through the thighs, with the patient in the sup appendicular skeleton, including ribs, humeri and femurs. The T12 metastasis is purely lytic. Some metastases are sclerotic and other bone metastases are mixed lytic and sclerotic. IMPRESSION: 1. A right apex neoplasm is decreased in size and SUV max.  2 maintaining the necessary diagnostic image quality. ADVERSE REACTION: None. FINDINGS:  HEART/THORACIC AORTA: The heart is normal in size and shape and the aorta is grossly normal in course and caliber.      MEDIASTINUM/LI: No abnormally enlarged lymph nod MESENTERY/RETROPERITONEUM: There is no evidence of mesenteric, retroperitoneal or inguinal adenopathy. BOWEL: The sigmoid colon is incompletely distended. The appendix looks unremarkable. There is no free air or significant free fluid.  OSSEOUS STRUCTURES: aeration of the left lung base from exam earlier the same day. * Heart size appears normal. * Right lung is stable. Dictated by (CST): Ronda Broussard MD on 4/24/2018 at 14:09     Approved by (CST):  Ronda Broussard MD on 4/24/2018 at 14:10 right thoracentesis. TECHNIQUE:   Single view. CONCLUSION:   * No pneumothorax post right thoracentesis. * Improved aeration of the right lung base. * Consolidation/atelectasis left base with blunting of costophrenic angle. * Stable heart size. TECHNIQUE:   Single view. FINDINGS:  CARDIAC/VASC: The cardiomediastinal silhouette is not enlarged. The pulmonary vascularity is within normal limits. MEDIAST/LI: No visible mass or adenopathy.  LUNGS/PLEURA: Multifocal masslike patchy airspace consol aneurysm or dissection. LUNGS/PLEURA: 1.6 x 1.2 cm spiculated density in the right lung apex is less well-defined than on the previous exams, but otherwise unchanged.  A anterior right ankle 2 cm region of airspace opacification, and patchy regions of airsp lobe mass or masslike region of consolidation. 5. Other smaller new pulmonary nodules as described. 6. Osseous metastases including a large lytic T12 vertebral body lesion-unchanged. 7. Small pericardial effusion.  1.    Dictated by (CST): Javier Vanessa MD advanced coaxially, and 3 passes were made, core and bloody aspirate samples  obtained for pathology. Adequate tissue confirmed by cytology.   The patient tolerated the procedure well, without immediate complications and was transferred to recovery in [facility] [place]

## 2018-04-29 NOTE — PROGRESS NOTES
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 Imaging:  Xr Chest Ap/pa (1 View) (cpt=71045)    Result Date: 4/24/2018  PROCEDURE: XR CHEST AP/PA (1 VIEW) (CPT=71010)  COMPARISON: [facility], CT CHEST PAIN/PE (IV ONLY) EM, 4/15/2018, 17:26.   [facility], 4/24/2018 at 8:36          Xr Chest Pa + Lat Chest (cpt=71046)    Result Date: 4/15/2018  PROCEDURE: XR CHEST PA + LAT CHEST (HQW=86197)  COMPARISON: 9/27/17 outside chest x-ray. INDICATIONS: Shortness of breath, slight cough and vomiting today.  Lung canc CALVARIUM: No apparent fracture, mass, or other significant visible lesion. SINUSES: Limited views demonstrate no significant mucosal thickening or fluid. ORBITS: Limited views are unremarkable. OTHER: Negative.        CONCLUSION:   1.1 cm hypodense les enhancement. CONCLUSION: No acute intracranial process. There are scattered T2/FLAIR signal changes within the cerebral white matter are which may represent sequela of prior inflammation or prior infection.   There is history of lung cancer, and there stenosis, or foraminal stenosis. L2-L3: No significant disc/facet abnormality, spinal stenosis, or foraminal stenosis. L3-L4: No significant disc/facet abnormality, spinal stenosis, or foraminal stenosis.   L4-L5: There is a small diffuse disc bulge with No lower extremity DVT-bilaterally.      Dictated by (CST): Chika Steinberg MD on 4/15/2018 at 12:41     Approved by (CST): Chika Steinberg MD on 4/15/2018 at 12:42          Pet/ct Standard Body Scan (oncology) (DAJ=16121)    Result Date: 3/27/2018  DATE OF S are multiple new FDG avid lesions in the skeleton.  Some of the most hypermetabolic include T1 vertebral body SUV max 6.8, C4 vertebral body SUV max 11.9, base of the left coracoid process SUV max 7.6, inner left scapula SUV max 7.7, lower sternum SUV max 5 lung. COMPARISON STUDY: Chest CT 9/14/2017. Whole-body PET/CT 3/27/2018.  TECHNIQUE:  Axial images of the chest, abdomen and pelvis were performed from the lung apices through the pubic symphysis after the injection of nonionic intravenous contrast. Oral co dilatation. PANCREAS: The pancreas is grossly normal in size and homogeneous. There is no pancreatic ductal dilatation. SPLEEN: The spleen is grossly normal in size and homogeneous without focal solid or cystic lesions.  ADRENAL GLANDS: The adrenal glands a Approved by (CST): Ralph Garcia MD on 4/24/2018 at 14:59          Xr Chest Ap Portable  (cpt=71045)    Result Date: 4/24/2018  PROCEDURE: XR CHEST AP PORTABLE (CPT=71010) TIME: 13:45  COMPARISON: [facility], XR CHEST AP/PA (1 VIEW) (CPT edema. 5. Patchy areas of pneumonia and/or edema in both lungs without a significant change.      Dictated by (CST): Vamsi Rivera MD on 4/23/2018 at 16:08     Approved by (CST): Vamsi Rivera MD on 4/23/2018 at 16:16          Xr Chest Ap Portable  (cpt=7 Suyapa Maxwell MD on 4/21/2018 at 15:37     Approved by (CST): Erik Rodríguez MD on 4/21/2018 at 15:40          Xr Chest Ap Portable  (cpt=71045)    Result Date: 4/18/2018  PROCEDURE: XR CHEST AP PORTABLE (CPT=71010) TIME: 8040.    COMPARISON: Anushka Huddleston intravenous contrast material.  Automated exposure control for dose reduction was used. Adjustment of the mA and/or kV was done based on the patient's size. Use of iterative reconstruction technique for dose reduction was used.    FINDINGS:  CARDIAC: Small lytic lesions in the T11 vertebral body lesion. Probable Schmorl's node along the superior endplate of G39. Patchy sclerosis in T3 vertebral body. OTHER: Negative. CONCLUSION:  1. No evidence for pulmonary embolus.  2. CHF with pulmonary interstitial INDICATIONS: History of stage IIIB lung cancer, with bone lesions suspicious for metastasis  FINDINGS: Following informed consent, the patient was placed prone, and the back was prepped and draped in sterile fashion.  The involved area of the L4 vertebral b

## 2018-04-29 NOTE — PHYSICAL THERAPY NOTE
PHYSICAL THERAPY EVALUATION - INPATIENT     Room Number: 454/454-A  Evaluation Date: 4/29/2018  Type of Evaluation: Initial   Physician Order: PT Eval and Treat    Presenting Problem: acute bronchitis lung cancer with mets to his back  Reason for Therapy: mobility; Body mechanics; Endurance; Patient education;Gait training;Family education;Stair training;Transfer training;Balance training  Rehab Potential : Fair  Frequency (Obs): 5x/week       PHYSICAL THERAPY MEDICAL/SOCIAL HISTORY   Problem List  Principal P WNL    BALANCE  Static Sitting: Good  Dynamic Sitting: Good  Static Standing: Fair  Dynamic Standing: Not tested    ACTIVITY TOLERANCE  O2 Device: Nasal cannula  Liters of O2:  4    AM-PAC '6-Clicks' INPATIENT SHORT FORM - BASIC MOBILITY  How much difficul Goal #4 Patient will negotiate 5 stairs/one curb w/ assistive device and supervision   Goal #4   Current Status    Goal #5 Patient to demonstrate independence with home activity/exercise instructions provided to patient in preparation for discharge.    Go

## 2018-04-29 NOTE — OCCUPATIONAL THERAPY NOTE
OCCUPATIONAL THERAPY EVALUATION - INPATIENT     Room Number: 454/454-A  Evaluation Date: 4/29/2018  Type of Evaluation: Initial  Presenting Problem: thoracic back pain    Physician Order: IP Consult to Occupational Therapy  Reason for Therapy: ADL/IADL Dys • Tattoo of skin     earliest 12       Past Surgical History  Past Surgical History:  No date: CYTOLOGY FINE NEEDLE Bilateral      Comment: 4L, 4R LN  2010: OTHER SURGICAL HISTORY      Comment: Left hand laceration and tendon repair    HOME SITUATION A Little  -   Putting on and taking off regular upper body clothing?: A Little  -   Taking care of personal grooming such as brushing teeth?: A Little  -   Eating meals?: A Little    AM-PAC Score:  Score: 18  Approx Degree of Impairment: 46.65%  Standardiz

## 2018-04-30 PROBLEM — E46 MALNUTRITION (HCC): Status: ACTIVE | Noted: 2018-01-01

## 2018-04-30 NOTE — PROGRESS NOTES
Hematology/Oncology  Progress Note        NAME: Kanchan Olvera - ROOM: [address] [place] [address]- - MRN: [id number] - Age: 52year old - : 1970    Subjective   Seen by palliative care this weekend, he reports interventions helping, feels that his nausea occurs aft CA  8.4*  8.2*  8.4*  8.2*   NA  128*  127*  127*  128*   K  4.5  4.3  4.2  4.3   CL  94*  91*  93*  96   CO2  26  25  23  23       Assessment/Plan:  Mr. Haque is a 52year old gentleman with metastatic adenocarcinoma of the lung with disease in the bone

## 2018-04-30 NOTE — PHYSICAL THERAPY NOTE
Attempted to see patient for physical therapy session. Patient with RRT last night, 4/29 into 4/30 for tachycardia. Patient with heart rate into 160's with minimal exertion per RN. Patient not appropriate for skilled physical therapy at this time.  Will att

## 2018-04-30 NOTE — PROGRESS NOTES
ASSESSMENT/PLAN:    Impression: 1. Sinus tachycardia in a 52year-old with multiple issues including metastatic lung cancer, anemia, pain from his meds. He does not appear to have any structural cardiac issues.   I suspect this is a physiologic respons fever, chills, significant weight change. CV:see HPI. RESP:denies chronic cough, hemoptysis. GI:denies melena, hematochezia. :denies hematuria. INTEG:no new rashes. MS:no limiting arthritis. NEURO:no localized deficits. HEME/LYMPH:denies easy bruising.  A

## 2018-04-30 NOTE — SIGNIFICANT EVENT
Nocturnist    Called for elevated HR. Reportedly got up to go to the bathroom and had decreased oxygen and increased HR. HR in 140's s tach with ? Of short bouts of afib. Patient complains of persistent pain in chest r side. Now on NRB mask.   Patient s

## 2018-04-30 NOTE — PAYOR COMM NOTE
--------------  CONTINUED STAY REVIEW    Payor: Thanh Boyer  #:  [id number]  Authorization Number: Nelli Minor date: 4/15/18  Admit time: Amy Page 1527    Admitting Physician: Bradley Jose MD  Attending Physician:  Deyanira Reilly MD  Pr Intravenous Julia Noonan, RN      morphINE Sulfate IR (MSIR) tab 15 mg     Date Action Dose Route User    4/30/2018 1239 Given 15 mg Oral Pito Alvarez RN    4/30/2018 3723 Given 15 mg Oral Asya Reyna RN    4/29/2018 1758 Given 15 mg Oral Canneemal elevated.  Echo with normal EF.  S/p thoracentesis on R 4/23 and L 4/24 with some improvement.      Acute hypoxic respiratory failure-suspect multifactorial, pna, pulmonary edema, lung cancer, atelectasis, pleural effusion  -completed 7d zosyn  -s/p diureti palliative care consulted for symptom management, increased MS contin 4/27 to 90 mg BID, decreased to 60 mg BID on 4/28 due to increased lethargy     Hyponatremia, chronic. Stable. outpt f/u     Anemia, chronic 2/2 chemo/malignancy. Stable.  outpt f/u     S Intravenous 6x daily   • metoprolol Tartrate  25 mg Oral TID   • apixaban  5 mg Oral 2 times per day   • Pantoprazole Sodium  40 mg Oral QAM AC   • docusate sodium  100 mg Oral BID   • Senna  8.6 mg Oral BID      morphINE sulfate, morphINE Sulfate IR, Meto Patient seen by cardiology for the same felt elevated HR due to lung cancer, anemia, pain.       /58 (BP Location: Right arm)   Pulse 112   Temp 98.2 °F (36.8 °C) (Oral)   Resp 22   Ht 6' 7\"   Wt 148 lb 4.8 oz   SpO2 91%   BMI 16.70 kg/m²   Patient non-tender;rectal deferred. MS:inadequate gait for exercise testing. EXT:no clubbing or cyanosis. SKIN:no rashes,lesions. NEURO/PSYCH:alert and oriented. Normal affect.     CV: PALP:PMI not displaced; no lifts, thrills or rubs.  AUSC:reg rhythm, normal S1, really appreciates the chaplains helping him with this      Objective:  Vital Signs:  Blood pressure 123/81, pulse 112, temperature 98.1 °F (36.7 °C), temperature source Oral, resp.  rate 22, height 6' 7\" (2.007 m), weight 148 lb 4.8 oz (67.3 kg), SpO2 95 only     Assessment/Recommendations:    Acute bronchitis, unspecified organism       Acute bronchitis       Primary malignant neoplasm of lung metastatic to other site, unspecified laterality ([facility] [address]       Hypoxia       Sepsis due to pneumonia [facility])       Ac Sulfate ER  75 mg Oral 2 times per day   • lidocaine  1 patch Transdermal Daily   • ondansetron HCl  8 mg Intravenous 6x daily   • apixaban  5 mg Oral 2 times per day   • Pantoprazole Sodium  40 mg Oral QAM AC   • docusate sodium  100 mg Oral BID   • Senna normal ECHO  - pulm on board   - s/p right and left thora  - now 02 dependant   - CTA done yesterday night with moderate pulmonary edema, ddx includes lymphangitic carcinomatosis     Tachycardia  - on tele floor due to tachycardia   - cardiology on board

## 2018-04-30 NOTE — OCCUPATIONAL THERAPY NOTE
Attempted to see pt for Occupational Therapy, however pt had RRT last night per MARI Morin, 4/29 into 4/30 for tachycardia. Patient with heart rate into 160's with minimal exertion.   Pt is not appropriate for skilled Occupational therapy at this time and kilo

## 2018-04-30 NOTE — CONSULTS
606/706 Beth Méndez Patient Status:  Inpatient    1970 MRN [id number]   Location [facility] 4W/SW/SE Attending Lupis Mendez MD   Hosp Day # 13 PCP Claudio Broussard MD     Date of Consu infusion, , ,   •  sodium chloride 0.9 % infusion, , ,   •  Metoprolol Tartrate (LOPRESSOR) tab 50 mg, 50 mg, Oral, 2x Daily(Beta Blocker)  •  morphINE Sulfate ER (MS CONTIN) CR tab 15 mg, 15 mg, Oral, 2 times per day  •  morphINE Sulfate ER (MS CONTIN) CR then 10mg x 3 days       Hematology:  Lab Results  Component Value Date   WBC 8.0 04/30/2018   WBC 8.0 04/30/2018   HGB 8.1 (L) 04/30/2018   HGB 8.1 (L) 04/30/2018   HCT 24.5 (L) 04/30/2018   HCT 24.5 (L) 04/30/2018    04/30/2018    04/30/201 in the differential.  Diffuse osseous metastatic disease with a pathologic fracture of the superior endplate of O49, unchanged. Preliminary report was submitted by the [facility] teleradiologist and there are no significant discrepancies.   Dictated by (CST): [phone]  Pre-existing DNR/DNI Order: No  Describe Patient Wishes: FULL CODE STATUS    Spiritual needs addressed: Referral placed for Spiritual Care    Disposition: ongoing goals of care discussions    Patient and family are agreeable to palliative car [id number]  4/30/2018  9:12 AM

## 2018-04-30 NOTE — PROGRESS NOTES
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   - continue daily BMP  - up to 1.5 on 4/29, states he had decreased PO intake yesterday, will hol 35 mins with > 50% spent counseling patient face to face    Subjective     Overnight, severely short of breath, HR 160s, hypoxic, RRT called.  Had CT PE negative for PE but did show ?fluid overload vs. lymphangitic spread    Objective     OBJECTIVE:  Temp: 5148  04/30/18   0231   WBC  8.0  8.1  8.0  8.0   HGB  8.0*  8.1*  8.1*  8.1*   MCV  88.1  88.0  88.3  88.3   PLT  279  306  312  312   BAND   --    --   3       Recent Labs   Lab  04/28/18   0517  04/29/18   0452  04/30/18   0231   NA  128*  127*  127*  1 FINDINGS: Following informed consent, the patient was placed prone, and the back was prepped and draped in sterile fashion. The involved area of the L4 vertebral body was targeted for biopsy.   Utilizing fluoroscopic guidance, the 11 gauge cannula was advan changes within the cerebral white matter are which may represent sequela of prior inflammation or prior infection. There is history of lung cancer, and there are no suspicious enhancing focus to suggest metastatic disease.   Dictated by (CST): Inocente Earl, Dictated by (CST): Olivia Valenzuela MD on 4/27/2018 at 10:31     Approved by (CST): Olivia Valenzuela MD on 4/27/2018 at 10:36          Xr Chest Ap Portable  (cpt=71045)    Result Date: 4/26/2018  CONCLUSION:  1.  Perhaps slight increase in consolidation/atelect 4/21/2018  CONCLUSION:   Right greater than left multifocal airspace disease with small pleural effusions, superimposed on a background of interstitial edema. Findings are not significantly changed.   Dictated by (CST): Jerry Xavier MD on 4/21/2018 at by (CST): Nehemiah Rascon MD on 4/23/2018 at 12:01

## 2018-04-30 NOTE — PROGRESS NOTES
Pulmonary Progress Note     Assessment / Plan:  1. Dyspnea and cough - due to cancer, pleural effusions and recent pneumonia. Improved after bilateral therapeutic thoracenteses. Now with acute worsening overnight.  CTA chest with pulm edema vs lymphangitic

## 2018-05-01 NOTE — PLAN OF CARE
Date:5/01/2018  Drug name and dosage administered  OPTIVO  Drug Sequence:    Location of intravenous site (e.g. Central line, peripheral line):  PERIPHERAL LINE  Needle size, type, location:  20G RIGHT ARM  Type of pump (if infusion):     Verification of p

## 2018-05-01 NOTE — PROGRESS NOTES
Hernanmarquise Nobles Patient Status:  Inpatient    1970 MRN [id number]   Location [facility] 2W/SW Attending Magui Perry MD   [facility] Day # 12 PCP Tawanna Coburn MD     Critical Care Progress Note      Assessment/Plan:  1.  Dyspnea and cough - due t 40 mg Oral QAM AC   • docusate sodium  100 mg Oral BID   • Senna  8.6 mg Oral BID            Intake/Output Summary (Last 24 hours) at 05/01/18 1005  Last data filed at 05/01/18 0600   Gross per 24 hour   Intake              840 ml   Output             1225

## 2018-05-01 NOTE — PLAN OF CARE
DISCHARGE PLANNING    • Discharge to home or other facility with appropriate resources Not Progressing        GASTROINTESTINAL - ADULT    • Minimal or absence of nausea and vomiting Not Progressing    • Maintains adequate nutritional intake (undernourished

## 2018-05-01 NOTE — PROGRESS NOTES
Hematology/Oncology  Progress Note        NAME: Mont Landau - ROOM: 520997-B - MRN: [id number] - Age: 52year old - : 1970    Subjective   Now in ICU on 15L of high flow 02.      Medications:  • furosemide  20 mg Intravenous Once   • metoprolol with metastatic adenocarcinoma of the lung with disease in the bone admitted with severe sob/HER.      SOB/ PNA/ bilateral pleural effusions  - dopplers and CT angiogram negative for VTE  - completed Zosyn  - normal ECHO  - pulm on board   - s/p right and

## 2018-05-01 NOTE — PROGRESS NOTES
ASSESSMENT/PLAN:    Impression: 1. Sinus tachycardia in a 52year-old with multiple issues including metastatic lung cancer, anemia, pain from his meds. He does not appear to have any structural cardiac issues.   I suspect this is a physiologic respons OF SYSTEMS:   CONS:denies fever, chills, significant weight change. CV:see HPI. RESP:denies chronic cough, hemoptysis. GI:denies melena, hematochezia. :denies hematuria. INTEG:no new rashes. MS:no limiting arthritis. NEURO:no localized deficits.  HEME/LYM

## 2018-05-01 NOTE — TELEPHONE ENCOUNTER
Spoke with Adriane Izquierdo to clarify if pt needs opdivo as an outpt treatment or inpatient since he's currently admitted. She stts pt is to get opdivo while inpatient, ok to d/c order that was faxed.  stts appropriate order was faxed to inpatient pharmacy

## 2018-05-01 NOTE — CONSULTS
606/706 Beth Méndez Patient Status:  Inpatient    1970 MRN [id number]   Location [facility] 2W/SW Attending Mckinley Seals MD   [facility] Day # 12 PCP Korina Nayak MD     Date of Consult:  apixaban (ELIQUIS) tab 5 mg, 5 mg, Oral, 2 times per day  •  Pantoprazole Sodium (PROTONIX) EC tab 40 mg, 40 mg, Oral, QAM AC  •  Prochlorperazine Maleate (COMPAZINE) tab 5 mg, 5 mg, Oral, Q6H PRN  •  Prochlorperazine Edisylate (COMPAZINE) injection 10 mg right lung base airspace disease, with persistent moderate left lung base airspace disease. Small bilateral pleural effusions have worsened bilaterally. Heart size upper limits of normal, and there is mild pulmonary congestive change.  Followup studies are 106/77, pulse 100, temperature 98.5 °F (36.9 °C), temperature source Temporal, resp. rate 22, height 6' 7\" (2.007 m), weight 149 lb 3.2 oz (67.7 kg), SpO2 96 %. Body mass index is 16.8 kg/m².   Present Level of pain: none per his report  Non-verbal signs wife    After discussion of pathways and pros and cons, Stefani Martínez wants to proceed with immunotherapy in hospital understanding that he is high risk for intubation and peg and possible death from these procedures due to his current frailty.     Dr Edna Reynolds

## 2018-05-01 NOTE — PLAN OF CARE
Reason the RRT was called:  Patient desaturated to 63 % on 5 L of oxygen.  's a flutter   Assessment of patient leading up to RRT: patient SOB  with any activities, 5 L HFNC  Patient stood up at the bedside to use urinal. HR went up to 130-140's BP

## 2018-05-01 NOTE — PROGRESS NOTES
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     Shaista Monge MD  [facility] Hospitalist  Answering Service: [phone]    Time spent with patient: 35 mins with > 50% spent counseling patient face to face Normal Saline Flush, acetaminophen, ipratropium-albuterol, PEG 3350, magnesium hydroxide, bisacodyl    Data Review:       Labs:     Recent Labs   Lab  04/29/18   0452  04/30/18   0231  05/01/18   0434   WBC  8.1  8.0  8.0  7.9   HGB  8.1*  8.1*  8.1*  8.0* Ir Biopsy    Result Date: 4/4/2018  PROCEDURE: IR BONE BIOPSY  COMPARISON: None.   INDICATIONS: History of stage IIIB lung cancer, with bone lesions suspicious for metastasis  FINDINGS: Following informed consent, the patient was placed prone, and the b Kirsten Maza MD on 4/19/2018 at 17:29          Mri Brain (w+wo) (cpt=70553)    Result Date: 4/19/2018  CONCLUSION: No acute intracranial process.  There are scattered T2/FLAIR signal changes within the cerebral white matter are which may represent sequela of prio pleural effusion. Persistent right perihilar airspace disease and or mass. Normal heart size with interstitial pulmonary congestive changes. Followup study is advised.     Dictated by (CST): Judd Montelongo MD on 4/27/2018 at 10:31     Approved by (CST): Nyla Chavira Payton Meier MD on 4/23/2018 at 12:36     Approved by (CST):  Santiago Garcia MD on 4/23/2018 at 12:46          Xr Chest Ap Portable  (cpt=71045)    Result Date: 4/21/2018  CONCLUSION:   Right greater than left multifocal airspace disease with small pleural eff 4/23/2018  CONCLUSION: Ultrasound guided right thoracentesis performed by Dr. Miya Castellon.      Dictated by (CST): Lashae Swift MD on 4/23/2018 at 11:59     Approved by (CST): Lashae Swift MD on 4/23/2018 at 12:01

## 2018-05-02 NOTE — PROGRESS NOTES
05/02/18 1100   Clinical Encounter Type   Visited With Patient and family together   Routine Visit Follow-up   Continue Visiting Yes   Crisis Visit Critical care   Referral From Family   Referral To    Uats[facility] Encounters   [facility]ats[facility] Needs [place]

## 2018-05-02 NOTE — DIETARY NOTE
ADULT NUTRITION REASSESSMENT     Pt is at high nutrition risk. Pt meets malnutrition criteria.       CRITERIA FOR MALNUTRITION DIAGNOSIS:  Criteria for severe malnutrition diagnosis: chronic illness related to wt loss greater than 10% in 6 months, energy possible PEG in future with PN support for present time. Discussed with RN. Will re-eval tomorrow EN vs PN  As pt desires full code. NUTRITION INTERVENTION:  - RD Malnutrition Care Plan:EN vs PN as above in view of very poor po intake.      - Meals and 81.2 kg (179 lb)  03/31/18 : 77.6 kg (171 lb 1.6 oz)  03/21/18 : 79 kg (174 lb 3.2 oz)  03/08/18 : 80.2 kg (176 lb 12.8 oz)  02/27/18 : 83.9 kg (185 lb)  02/16/18 : 79.7 kg (175 lb 12.8 oz)  02/15/18 : 80.8 kg (178 lb 3.2 oz)  02/06/18 : 83.7 kg (184 lb 9. 8.2* 8.4* 8.2*   MG  --   --  1.8   *  128* 133* 128*   K 4.2  4.3 4.5 4.4   CL 93*  96 98 94*   CO2 23  23 26 25   OSMOCALC 270*  273* 281 274*       NUTRITION RELATED PHYSICAL FINDINGS:  - Body Fat/Muscle Mass: severe depletion body fat orbital reg

## 2018-05-02 NOTE — PROGRESS NOTES
ASSESSMENT/PLAN:    Impression: 1. Sinus tachycardia in a 52year-old with multiple issues including metastatic lung cancer, anemia, pain from his meds. He does not appear to have any structural cardiac issues.   I suspect this is a physiologic respons SYSTEMS:   CONS:denies fever, chills, significant weight change. CV:see HPI. RESP:denies chronic cough, hemoptysis. GI:denies melena, hematochezia. :denies hematuria. INTEG:no new rashes. MS:no limiting arthritis. NEURO:no localized deficits.  HEME/LYMPH:

## 2018-05-02 NOTE — PROGRESS NOTES
Hematology/Oncology  Progress Note        NAME: Sadia Pretty - ROOM: [address] [place] [address] - MRN: [id number] - Age: 52year old - : 1970    Subjective   s/p Opdivo yesterday- no issues with treatment  Respiratory status declining, on rebreather today  Pain ALT   --    --   27   BILT   --    --   0.7   TP   --    --   5.8*       Assessment/Plan:  Mr. Aleyda Prescott is a 52year old gentleman with metastatic adenocarcinoma of the lung with disease in the bone admitted with severe sob/HER.      SOB/ PNA/ bilateral ple low.      Prognosis: poor    Ana Maria Funk MD  Hematology and Oncology   [facility]

## 2018-05-02 NOTE — PHYSICAL THERAPY NOTE
PHYSICAL THERAPY TREATMENT NOTE - INPATIENT     Room Number: 290/529-I       Presenting Problem: acute bronchitis lung cancer with mets to his back    Problem List  Principal Problem:    Acute bronchitis, unspecified organism  Active Problems:    Acute bro Static Sitting: Fair -  Dynamic Sitting: Fair -           Static Standing: Not tested  Dynamic Standing: Not tested    ACTIVITY TOLERANCE  O2 Device: non-re Current Status NT   Goal #4 Patient will negotiate 5 stairs/one curb w/ assistive device and supervision   Goal #4   Current Status NT   Goal #5 Patient to demonstrate independence with home activity/exercise instructions provided to patient in preparati

## 2018-05-02 NOTE — CONSULTS
606/706 Beth Méndez Patient Status:  Inpatient    1970 MRN [id number]   Location [facility] 2W/SW Attending Elmyra Nageotte, MD   [address] Day # 16 PCP Claudio Broussard MD     Date of Consult:  Oral, 2 times per day  •  Pantoprazole Sodium (PROTONIX) EC tab 40 mg, 40 mg, Oral, QAM AC  •  Prochlorperazine Maleate (COMPAZINE) tab 5 mg, 5 mg, Oral, Q6H PRN  •  Prochlorperazine Edisylate (COMPAZINE) injection 10 mg, 10 mg, Intravenous, Q6H PRN  •  gu airspace disease with a moderate amount still remaining. Loculated right pleural effusion with loculated fluid at the right apex now identified. Blunting of the left costophrenic angle suggesting some pleural fluid.  Right parahilar soft tissue mass poorly metastatic to other site, unspecified laterality ([facility] [address]      Hypoxia      Sepsis due to pneumonia [facility])      Acute right-sided thoracic back pain      Shortness of breath      Pain, neoplasm-related      Malnutrition (HCC)    Palliative Performance Scale 40

## 2018-05-02 NOTE — PROGRESS NOTES
Pulmonary Progress Note     Assessment / Plan:  1. Dyspnea and cough - due to cancer, pleural effusions and recent pneumonia. Improved after bilateral therapeutic thoracenteses. Now with acute worsening.  CTA chest with pulm edema vs lymphangitic carcinomat

## 2018-05-02 NOTE — CM/SW NOTE
CARE MANAGEMENT    Discharge Planning Evaluation: Met with pt and father and son at bedside, on 100% NRM. From home w/wife, independent prior to admission, states lost over 50# in the past 6 months.  Pt wants to remain a full code when discussed goals

## 2018-05-02 NOTE — OCCUPATIONAL THERAPY NOTE
OCCUPATIONAL THERAPY TREATMENT NOTE - INPATIENT        Room Number: 029/624-S           Presenting Problem: thoracic back pain    Problem List  Principal Problem:    Acute bronchitis, unspecified organism  Active Problems:    Acute bronchitis    Primary ma (including washing, rinsing, drying)?: A Little  -   Toileting, which includes using toilet, bedpan or urinal? : A Little  -   Putting on and taking off regular upper body clothing?: A Little  -   Taking care of personal grooming such as brushing teeth?: A

## 2018-05-02 NOTE — CM/SW NOTE
3:11pm Update- [facility] has accepted the case, but will need clarified orders and Face to Face encounter to confirm the referral.    --  11:02am-Pt transferred to critical care following rapid response. Pt now requiring 15L O2 NRB.  Pt remains Full Cod

## 2018-05-02 NOTE — PLAN OF CARE
CARDIOVASCULAR - ADULT    • Maintains optimal cardiac output and hemodynamic stability Not Progressing        DISCHARGE PLANNING    • Discharge to home or other facility with appropriate resources Not Progressing        GASTROINTESTINAL - ADULT    • Genesis Moon

## 2018-05-02 NOTE — PROGRESS NOTES
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     Issa Valdez MD  [facility] Hospitalist  Answering Service: [phone]    Time spent with patient: 35 mins with > 50% spent counseling patient face to face Metoclopramide HCl, benzonatate, Prochlorperazine Maleate, Prochlorperazine Edisylate, guaiFENesin-codeine, Normal Saline Flush, acetaminophen, ipratropium-albuterol, PEG 3350, magnesium hydroxide, bisacodyl    Data Review:       Labs:     Recent Labs   La Approved by (CST): Lorenzo Guevara MD on 4/23/2018 at 12:01          Ir Biopsy    Result Date: 4/4/2018  PROCEDURE: IR BONE BIOPSY  COMPARISON: None.   INDICATIONS: History of stage IIIB lung cancer, with bone lesions suspicious for metastasis  FINDING (CST): Dayne Aschoff, MD on 4/19/2018 at 17:24     Approved by (CST): Dayne Aschoff, MD on 4/19/2018 at 17:29          Mri Brain (w+wo) (MPZ=[id number])    Result Date: 4/19/2018  CONCLUSION: No acute intracranial process.  There are scattered T2/FLAIR signal change examination was read on call by [facility] radiology services with a similar interpretation given.     Dictated by (CST): Mackenzie Weldon MD on 4/30/2018 at 9:29     Approved by (CST): Mackenzie Weldon MD on 4/30/2018 at 9:33          Xr Chest Ap Portable  (cpt=71 basilar pleural effusion. Small residual right basilar pleural effusion. 3. Compressive atelectasis left lung base. 4. Bilateral interstitial abnormality likely representing interstitial edema.  5. Patchy areas of pneumonia and/or edema in both lungs withou was submitted by the [facility] teleradiologist and there are no significant discrepancies.   Dictated by (CST): Jim Reed MD on 4/30/2018 at 8:54     Approved by (CST): Jim Reed MD on 4/30/2018 at 9:04          Us Thoracentesis Guided Right (cpt=32555)

## 2018-05-02 NOTE — PLAN OF CARE
Problem: PAIN - ADULT  Goal: Verbalizes/displays adequate comfort level or patient's stated pain goal  INTERVENTIONS:  - Encourage pt to monitor pain and request assistance  - Assess pain using appropriate pain scale  - Administer analgesics based on type difficulty  - Respiratory Therapy support as indicated  - Manage/alleviate anxiety  - Monitor for signs/symptoms of CO2 retention   Outcome: Progressing      Problem: GASTROINTESTINAL - ADULT  Goal: Minimal or absence of nausea and vomiting  INTERVENTIONS: quality of pulses, skin color and temperature  - Assess for signs of decreased coronary artery perfusion - ex. Angina  - Evaluate fluid balance, assess for edema, trend weights   Outcome: Progressing      Comments: Oriented x 4.  Morphine given for pain, sc

## 2018-05-02 NOTE — RESPIRATORY THERAPY NOTE
Patient was put on BIPAP 10/5 100% per Dr's orders. Patient did not tolerate well even when higher settings were tried. He desaturated to 69% within the 1st minute of wearing the BIPAP. The patient was put back on a non rebreather at 15 L.  He is now sating

## 2018-05-03 NOTE — PROGRESS NOTES
ASSESSMENT/PLAN:    Impression: 1. Sinus tachycardia in a 52year-old with multiple issues including metastatic lung cancer, anemia, pain from his meds. He does not appear to have any structural cardiac issues.   I suspect this is a physiologic respons hematochezia. :denies hematuria. INTEG:no new rashes. MS:no limiting arthritis. NEURO:no localized deficits. HEME/LYMPH:denies easy bruising. ALL:no new allergies. ROS otherwise negative except as in HPI.   EXAM:   /84 (BP Location: Left arm)   Puls

## 2018-05-03 NOTE — PROGRESS NOTES
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     Zahida Matos MD  [facility] Hospitalist  Answering Service: [phone]    Time sp acetaminophen, DiphenhydrAMINE HCl, LORazepam, Cyclobenzaprine HCl, Hydrocod Polst-CPM Polst ER, morphINE sulfate, morphINE Sulfate IR, Metoclopramide HCl, benzonatate, Prochlorperazine Maleate, Prochlorperazine Edisylate, guaiFENesin-codeine, Normal Salin performed by Dr. Julienne Barron. Dictated by (CST): Lorenzo Guevara MD on 4/23/2018 at 11:59     Approved by (CST): Lorenzo Guevara MD on 4/23/2018 at 12:01          Ir Biopsy    Result Date: 4/4/2018  PROCEDURE: IR BONE BIOPSY  COMPARISON: None.   I are continued neurological symptoms, MRI brain recommended to further evaluate.        Dictated by (CST): Mele Christensen MD on 4/19/2018 at 17:24     Approved by (CST): Mele Christensen MD on 4/19/2018 at 17:29          Mri Brain (w+wo) (HCX=[id number])    Result Date Persistent right perihilar opacity may suggest known lung cancer. Correlate clinically. The examination was read on call by [facility] radiology services with a similar interpretation given.     Dictated by (CST): Britt Montano MD on 4/30/2018 at 9:29     A (cpt=71045)    Result Date: 4/23/2018  CONCLUSION:  1. No procedure related pneumothorax. 2. Small moderate left basilar pleural effusion. Small residual right basilar pleural effusion. 3. Compressive atelectasis left lung base.  4. Bilateral interstitial a osseous metastatic disease with a pathologic fracture of the superior endplate of X17, unchanged. Preliminary report was submitted by the [facility] teleradiologist and there are no significant discrepancies.   Dictated by (CST): Mary Ayala MD on 4/30/2018

## 2018-05-03 NOTE — PLAN OF CARE
Problem: PAIN - ADULT  Goal: Verbalizes/displays adequate comfort level or patient's stated pain goal  INTERVENTIONS:  - Encourage pt to monitor pain and request assistance  - Assess pain using appropriate pain scale  - Administer analgesics based on type Respiratory Therapy support as indicated  - Manage/alleviate anxiety  - Monitor for signs/symptoms of CO2 retention   Outcome: Not Progressing      Problem: GASTROINTESTINAL - ADULT  Goal: Minimal or absence of nausea and vomiting  INTERVENTIONS:  - Tanna Carballo skin color and temperature  - Assess for signs of decreased coronary artery perfusion - ex. Angina  - Evaluate fluid balance, assess for edema, trend weights   Outcome: Progressing      Comments: Oriented x 4. Scheduled morphine given for pain.  Cyclobenzap

## 2018-05-03 NOTE — PLAN OF CARE
Maintains optimal cardiac output and hemodynamic stability Not Progressing    Pt remains in a ST, rates progressively up to 120's as day goes on, BP stable.    Maintains or returns to baseline bowel function Not Progressing    Pt passing some gas, no stools reach. Frequent checks on pt.

## 2018-05-03 NOTE — PAYOR COMM NOTE
ON 4/30 TRANSFERRED BACK TO [facility] [place] S/P RRT FOR RESP DISTRESS    CONTINUED STAY REVIEW    Payor: [facility] #:  [id number]  Authorization Number: Darlina Flow date: 4/15/18  Admit time: Amy Page 1527    Admitting Physician: MD CALLI Gallardo mg Intravenous Christian Marie RN      Pantoprazole Sodium (PROTONIX) EC tab 40 mg     Date Action Dose Route User    5/3/2018 7099 Given 40 mg Oral Yuridia Sebastian RN      [facility]) tab TABS 8.6 mg     Date Action Dose Route User    5/3/2018 11 87.9   MCH  29.2   MCHC  33.2   RDW  20.0*   WBC  7.9   PLT  316            Recent Labs   Lab  04/29/18   0452  04/30/18   0231  05/01/18   0434   GLU  118*  120*  123*  123*   BUN  29*  27*  28*  24*   CREATSERUM  1.56*  1.39  1.38  1.22   GFRAA  60  >60 care     Supportive care/GOC  - appreciate palliative care support and recommendations  - his prognosis is very poor and has cancer related cachexia, unable to keep foods down, and is losing and has lost significant weight, his PS is at best 3  - he would parker  Mental status - interactive, answering questions appropriately    5/2 ONCOLOGY PROGRESS NOTE  Subjective   s/p Opdivo yesterday- no issues with treatment  Respiratory status declining, on rebreather today  Pain worsening     Medications:  • metopro 23  26  25   ALKPHO   --    --   288*   AST   --    --   47*   ALT   --    --   27   BILT   --    --   0.7   TP   --    --   5.8*         Assessment/Plan:  Mr. Karlie Chiang is a 52year old gentleman with metastatic adenocarcinoma of the lung with disease in the like to be full code, however the likelihood of him surviving CPR is low.       Prognosis: poor     5/2 PCP  CC: Patient presents with:  Dyspnea KOBY SOB (respiratory)      Assessment/Plan:      Puma Méndez is a 52year old male with PMH sig for stage II 4 of 14 doses of XRT  -on hold while in house, d/w rad onc, unable to do inpatient radiation any more due to change in hospital policy  - cont home prn MS contin and IR-->doses increased per heme/onc  - palliative care consulted for symptom management, inc needs TPN     5/3 PCP NOTE  Dyspnea KOBY SOB (respiratory)      Assessment/Plan:      Puma Méndez is a 52year old male with PMH sig for 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 s/p concurrent chemo/XRT last round of chemo in Feb, hs o change in hospital policy  - cont home prn MS contin and IR-->doses increased per heme/onc  - palliative care consulted for symptom management, increased MS contin 4/27 to 90 mg BID, decreased to 60 mg BID on 4/28 due to increased lethargy, increased to 75 FULL      PPX; on eliquis, SCD     Dispo pending course     Questions/concerns were discussed with patient and/or family by bedside.     Alexis Salvador MD  [facility] Hospitalist  Answering Service: [phone]     Time spent with patient: 35 mins with > 50% spent cou LORazepam, Cyclobenzaprine HCl, Hydrocod Polst-CPM Polst ER, morphINE sulfate, morphINE Sulfate IR, Metoclopramide HCl, benzonatate, Prochlorperazine Maleate, Prochlorperazine Edisylate, guaiFENesin-codeine, Normal Saline Flush, acetaminophen, ipratropium-

## 2018-05-03 NOTE — PROGRESS NOTES
Vascular Access Note  Inserted by Katarzyna Richardson RN  Vascular Access Screening:   Allergies to Lidocaine: no  Allergies to Latex: no  Presence of Pacemaker/Defibrillator: No  Mastectomy with Lymph Node Dissection: No  AV Fistula / AV Graft: No  Dialysis Cath

## 2018-05-03 NOTE — DIETARY NOTE
Nutrition Note Update    Received MD consult to initiate TPN. PICC line placed, confirmed access to SVC for TPN. Chart reviewed. Labs and meds noted. Spoke with Dr. Faustino Adams for IV fluids.  Will start D5 1/2 NS at 83ml/hr now while waiting for TPN to be s

## 2018-05-03 NOTE — PROGRESS NOTES
Hematology/Oncology  Progress Note        NAME: Nikunj Graham - ROOM: 038/562-A - MRN: [id number] - Age: 52year old - : 1970    Subjective   Had one episode of hemptysis    Medications:  • [START ON 2018] Insulin Regular Human  1-5 Units Sub 0.7  0.9   TP   --   5.8*  5.9       Assessment/Plan:  Mr. Kimber Beckwith is a 52year old gentleman with metastatic adenocarcinoma of the lung with disease in the bone admitted with severe sob/HER.      SOB/ PNA/ bilateral pleural effusions  - dopplers and CT an of him surviving CPR is low.       Prognosis: poor    Inderjit Coronel MD  Hematology and Oncology   [facility]

## 2018-05-03 NOTE — CONSULTS
606/706 Beth Méndez Patient Status:  Inpatient    1970 MRN [id number]   Location [facility] 2W/SW Attending Garcia Rubio MD   [facility] Day # 25 PCP Elvira John MD     Date of Consult: 5/3/201 would stop his Toradol order        Allergies:  No Known Allergies    Medications:     Current Facility-Administered Medications:   •  dextrose 5 %-0.45 % NaCl infusion, , Intravenous, Continuous  •  Normal Saline Flush 0.9 % injection 10 mL, 10 mL, Kanchan Burdick Flush 0.9 % injection 3 mL, 3 mL, Intravenous, PRN  •  acetaminophen (TYLENOL) tab 650 mg, 650 mg, Oral, Q6H PRN  •  ipratropium-albuterol (DUONEB) nebulizer solution 3 mL, 3 mL, Nebulization, Q4H PRN  •  docusate sodium (COLACE) cap 100 mg, 100 mg, Oral, radiology services with a similar interpretation given.     Dictated by (CST): Claudeen Moss, MD on 5/02/2018 at 6:57     Approved by (CST): Claudeen Moss, MD on 5/02/2018 at 7:01            Objective:  Vital Signs:  Blood pressure 110/78, pulse 125, temper Yes      Palliative Care Follow Up:    A total of 15 minutes were spent on this consult, which included all of the following:direct face to face contact, history taking, physical examination, and >50% was spent counseling and coordinating care.     FULL COD

## 2018-05-03 NOTE — PROGRESS NOTES
Critical Care Progress Note     Assessment / Plan:  1. Dyspnea and cough - due to cancer, pleural effusions and recent pneumonia. Improved after bilateral therapeutic thoracenteses. Now with acute worsening.  CTA chest with lymphangitic carcinomatosis vs pu

## 2018-05-04 PROBLEM — J96.21 ACUTE ON CHRONIC RESPIRATORY FAILURE WITH HYPOXIA (HCC): Status: ACTIVE | Noted: 2018-01-01

## 2018-05-04 PROBLEM — R06.03 ACUTE RESPIRATORY DISTRESS: Status: ACTIVE | Noted: 2018-01-01

## 2018-05-04 NOTE — PLAN OF CARE
CARDIOVASCULAR - ADULT    • Absence of cardiac arrhythmias or at baseline Not Progressing    Pt in afib/aflutter. EKG obtained.  Started on cardizem gtt per nocturnalist. Updated oncall cardiologist.    DISCHARGE PLANNING    • Discharge to home or other fac

## 2018-05-04 NOTE — PROGRESS NOTES
ASSESSMENT/PLAN:    Impression: 1. Sinus tachycardia in a 52year-old with multiple issues including metastatic lung cancer, anemia, pain from his meds. He does not appear to have any structural cardiac issues.   I suspect this is a physiologic respons :denies hematuria. INTEG:no new rashes. MS:no limiting arthritis. NEURO:no localized deficits. HEME/LYMPH:denies easy bruising. ALL:no new allergies. ROS otherwise negative except as in HPI.   EXAM:   /73 (BP Location: Left arm)   Pulse 121   Temp (

## 2018-05-04 NOTE — PROGRESS NOTES
[facility] Hospitalist Progress Note     PCP: Magdalena Adam MD    CC: Patient presents with:  Dyspnea KOBY SOB (respiratory)     Assessment/Plan:     [place] Cam a 52year old male with PMH sig for stage IIIB (T2N2MX) adenocarcinoma of the right upper lo of 14 doses of XRT  -on hold while in house, d/w rad onc, unable to do inpatient radiation any more due to change in hospital policy  - cont home prn MS contin and IR-->doses increased per heme/onc  - palliative care consulted for symptom management, incre Objective     OBJECTIVE:  Temp:  [96 °F (35.6 °C)-99.5 °F (37.5 °C)] 96 °F (35.6 °C)  Pulse:  [103-153] 121  Resp:  [23-49] 46  BP: ()/(59-92) 105/73  FiO2 (%):  [80 %-100 %] 80 %    Intake/Output:    Intake/Output Summary (Last 24 hours) at 05/0 Normal Saline Flush, acetaminophen, ipratropium-albuterol, PEG 3350, magnesium hydroxide, bisacodyl    Data Review:       Labs:     Recent Labs   Lab  05/02/18   0438  05/03/18   0443  05/04/18   0553   WBC  8.3  7.9  8.8   HGB  7.9*  7.4*  7.7*   MCV  87. Biopsy    Result Date: 4/4/2018  PROCEDURE: IR BONE BIOPSY  COMPARISON: None.   INDICATIONS: History of stage IIIB lung cancer, with bone lesions suspicious for metastasis  FINDINGS: Following informed consent, the patient was placed prone, and the back was amount still remaining. Loculated right pleural effusion with loculated fluid at the right apex now identified. Blunting of the left costophrenic angle suggesting some pleural fluid. Right parahilar soft tissue mass poorly defined.  Followup studies are adv Xr Chest Ap Portable  (cpt=71045)    Result Date: 4/26/2018  CONCLUSION:  1. Perhaps slight increase in consolidation/atelectasis and pleural effusion at left base. 2. Markings appear more prominent suggesting increasing CHF. 3. Stable right lung. interstitial edema. Findings are not significantly changed.   Dictated by (CST): Marcell Lee MD on 4/21/2018 at 15:37     Approved by (CST): Marcell Lee MD on 4/21/2018 at 15:40          Ct Chest Pain/pe (iv Only) Em    Result Date: 4/30/2018  C

## 2018-05-04 NOTE — PAYOR COMM NOTE
--------------  CONTINUED STAY REVIEW    Payor: Than[employer] Boyer  #:  [id number]  Authorization Number: Preet Campa date: 4/15/18  Admit time: Amy Page 1527    Admitting Physician: Hunter Hall MD  Attending Physician:  Pineda Currie MD  Primary Ca Date Action Dose Route User    5/4/2018 0658 Given 3 mL Nebulization Danii Baez [place]    5/4/2018 1951 Given 3 mL Nebulization Danii Baez [place]    5/3/2018 1812 Given 3 mL Nebulization Byron Thomas, MAI      Metoprolol Tartrate (Eloina Gonzalez Progress Notes signed by Magui Perry MD at 5/4/2018 10:32 AM     Author: Magui Perry MD Service: Hospitalist Author Type: Physician   Filed: 5/4/2018 10:32 AM Date of Service: 5/4/2018 10:27 AM Status: Addendum   : Magui Perry MD (Physician)   Related N - decomp likely due to lymphangitic spread, bronch with biopsy (to confirm) would significantly increase risk of decomp and demise at this time, atypical infection less likely but pulm will check for fungal serologies and cultures   - repeat ECHO, but BNP - FULL CODE  - would want intubation/CPR and all interventions  - would be interested in PEG for nutrition given poor PO intake      FEN  -TPN started, picc line placed        CODE: FULL      PPX; on eliquis, SCD     Dispo pending course     Questions/conc • ondansetron HCl  8 mg Intravenous 6x daily   • Pantoprazole Sodium  40 mg Oral QAM AC   • docusate sodium  100 mg Oral BID   • Senna  8.6 mg Oral BID      • dextrose     • adult 3 in 1 TPN 75 mL/hr at 05/03/18 2112   • diltiazem 15 mg/hr (05/04/18 0500) PROCEDURE:  US THORACENTESIS GUIDED RIGHT (CPT=32555)  COMPARISON:   [facility], CT CHEST PAIN/PE (IV ONLY) EM, 4/15/2018, 17:26. [facility], XR CHEST AP PORTABLE (CPT=71045), 4/21/2018, 14:37.   INDICATIONS:        Right ef CONCLUSION:  Fluoroscopic guided biopsy of L4 vertebral body  Dictated by (CST): Carey Hanson MD on 4/04/2018 at 17:12     Approved by (CST): Carey Hanson MD on 4/04/2018 at 17:18              Xr Chest Ap/pa (1 View) (cpt=71045)     Result Date: 4/24 CONCLUSION:  1. Worsening right lung base airspace disease, with persistent moderate left lung base airspace disease. Small bilateral pleural effusions have worsened bilaterally.  Heart size upper limits of normal, and there is mild pulmonary congestive [place] CONCLUSION:   *          No complications status post left thoracentesis. * Significant improvement in aeration of the left lung base from exam earlier the same day. *            Heart size appears normal. *           Right lung is stable.              Dict CONCLUSION:   No pulmonary embolus in the central, main, lobar, segmental pulmonary arteries. Nondiagnostic in the subsegmental pulmonary arteries due to respiratory motion artifact. Findings suggestive of moderate pulmonary edema.  Lymphangitic carcinomat

## 2018-05-04 NOTE — CONSULTS
[id number] Beth Méndez Patient Status:  Inpatient    1970 MRN [id number]   Location [facility] 2W/SW Attending Danelle Landon MD   [facility] Day # 23 PCP Radha Ferreira MD     Date of Consult:  (CARDIZEM) premix/add-vantage, 2.5-20 mg/hr, Intravenous, Continuous  •  DILTIAZEM BOLUS FROM BAG 10 mg infusion, 10 mg, Intravenous, Q10 Min PRN  •  diazepam (VALIUM) injection 2.5 mg, 2.5 mg, Intravenous, Nightly PRN  •  acetaminophen (TYLENOL EXTRA STRE MG/5ML suspension 30 mL, 30 mL, Oral, Daily PRN  •  bisacodyl (DULCOLAX) rectal suppository 10 mg, 10 mg, Rectal, Daily PRN    Prior to Admission Medications:  ipratropium-albuterol 0.5-2.5 (3) MG/3ML Inhalation Solution Take 3 mL by nebulization every 4 ( Chas Alvarez, wife  Healthcare Agent's Phone Number: [phone]  Pre-existing DNR/DNI Order: No  Describe Patient Wishes: FULL CODE STATUS    Spiritual needs addressed: Referral placed for Spiritual Care    Disposition: ongoing goals of care discussions refusing his long acting Morphine this AM  I came down to his room again  He has more family members surrounding him and being with him  He is resting with his eyes closed with the 02 mask on  His wife was at bedside  I talked with her concerning the possi

## 2018-05-04 NOTE — OCCUPATIONAL THERAPY NOTE
Attempt for skilled OT treatment this AM. Pt not appropriate --per EMR, SOB worse this morning, hypoxic last night with 100% fio2 w/ possible intubation this AM. Will re-attempt tomorrow if appropriate to see pt.

## 2018-05-04 NOTE — PHYSICAL THERAPY NOTE
Pt was to be seen for PT treatment session. Consulted w/ RN. Per RN, pt is not appropriate to be seen at this time due to his poor respiratory status. Respiratory rate is 50 bpm. Possible intubation. Will re-attempt tomorrow if appropriate to see pt.

## 2018-05-04 NOTE — PROGRESS NOTES
Night MD - CCU    Impressions:  ---New onset a-flutter with 2:1 conduction  ---Worsening hypoxemic respiratory failure, which may be 2/2 A-flutter, or be leading to a-flutter?  ---Metastatic lung CA, just started new chemotherapy that may take weeks to hel

## 2018-05-04 NOTE — PROGRESS NOTES
Multiple discussions with patient and family. His respiratory status is only mildly improved with a slight decrease in work of breathing after starting BiPAP. His respiratory rate remains quite high.  Based on their goals of care, which has been discussed w

## 2018-05-05 NOTE — PROGRESS NOTES
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     Selena Long MD  [facility] Hospitalist  Answering Service: [phone]    Time spent with patient: 35 mins with > 50% spent counseling patient face to face      Subjective     Has been on BIPAP since yest BID     • adult 3 in 1 TPN     • propofol     • fentanyl (SUBLIMAZE) infusion     • adult 3 in 1 TPN 75 mL/hr at 05/05/18 1400   • dextrose     • diltiazem Stopped (05/05/18 1000)     morphINE sulfate, Normal Saline Flush, dextrose, diltiazem (CARDIZEM) stephen localization. The images submitted demonstrate a moderate effusion which may be partially loculated in the subpulmonic region. This was followed by an Ultrasound guided right sided thoracentesis, performed by Dr. Mack Olvera. No radiologist was present.   No Dictated by (CST): Patricia Rosa MD on 4/24/2018 at 8:32     Approved by (CST): Patricia Rosa MD on 4/24/2018 at 8:36          Us Thoracentesis Guided Left (cpt=32555)    Result Date: 4/24/2018  CONCLUSION:  Ultrasound-guided left lung thoracentesis MD JOSHUA on 4/29/2018 at 10:39     Approved by (CST): Helena Dobson MD on 4/29/2018 at 10:41          Xr Chest Ap Portable  (cpt=71045)    Result Date: 4/27/2018  CONCLUSION:  Worsening left basilar airspace disease and small left pleural effusion. 4/23/2018  CONCLUSION:   * No pneumothorax post right thoracentesis. * Improved aeration of the right lung base. * Consolidation/atelectasis left base with blunting of costophrenic angle. * Stable heart size. Dictated by (CST):  Armand Singh MD o

## 2018-05-05 NOTE — RESPIRATORY THERAPY NOTE
Pt on BIPAP, tachypnea,tachycardic. At 0920 am, switched to Airvo heated nasal cannula with 60lpm,fio2 95% to give break from cpap mask. At 0945 am switched back to BIPAP due to decreased spo2 to 88 & increased WOB .

## 2018-05-05 NOTE — ANESTHESIA POSTPROCEDURE EVALUATION
Patient: Pop Parsons    Procedure Summary     Date:  05/05/18 Room / Location:      Anesthesia Start:   Anesthesia Stop:      Procedure:  INTUBATION Diagnosis:      Scheduled Providers:   Anesthesiologist:  Debo Anderson DO    Anesthesia Type:  Not re

## 2018-05-05 NOTE — PLAN OF CARE
Safety Risk - Non-Violent Restraints    • Patient will remain free from self-harm Progressing    Restraints applied to bilateral wrists post intubation.   Family at bedside and educated on restraint use

## 2018-05-05 NOTE — PROGRESS NOTES
Hematology/Oncology  Progress Note        NAME: Shakira Vigil - ROOM: [facility]/162-F - MRN: [id number] - Age: 52year old - : 1970    Subjective   Worsening respiratory status, currently on bipap, plan for intubation today    Medications:  • Insulin R 25  23   BILT  0.7  0.9  0.9   TP  5.8*  5.9  5.7*       Assessment/Plan:  Mr. Candie Garcia is a 52year old gentleman with metastatic adenocarcinoma of the lung with disease in the bone admitted with severe sob/HER.      SOB/ PNA/ bilateral pleural effusions  - code, however the likelihood of him surviving CPR is low.       Prognosis: poor    Edgar Davis MD  Hematology and Oncology   [facility]

## 2018-05-05 NOTE — PLAN OF CARE
CARDIOVASCULAR - ADULT    • Maintains optimal cardiac output and hemodynamic stability Not Progressing    Pt -140's Cardizem gtt off.  's    DISCHARGE PLANNING    • Discharge to home or other facility with appropriate resources Not Progressing

## 2018-05-05 NOTE — PLAN OF CARE
CARDIOVASCULAR - ADULT    • Absence of cardiac arrhythmias or at baseline Not Progressing        GASTROINTESTINAL - ADULT    • Maintains or returns to baseline bowel function Not Progressing        Patient/Family Goals    • Patient/Family Long Term Goal No

## 2018-05-05 NOTE — PROGRESS NOTES
ASSESSMENT/PLAN:    Impression:     1. Sinus tachycardia in a 52year-old with multiple issues including metastatic lung cancer, anemia, pain from his meds. He does not appear to have any structural cardiac issues.   I suspect this is a physiologic res weight change. CV:see HPI. RESP:denies chronic cough, hemoptysis. GI:denies melena, hematochezia. :denies hematuria. INTEG:no new rashes. MS:no limiting arthritis. NEURO:no localized deficits. HEME/LYMPH:denies easy bruising. ALL:no new allergies.  ROS ot

## 2018-05-05 NOTE — PROGRESS NOTES
[facility]  Progress Note    Tawanda Human Patient Status:  Inpatient    1970 MRN [id number]   Location [facility] 2W/SW Attending Kanika Toussaint MD   Hosp Day # 21 PCP Juan Grayson MD     Subjective:    Breathing rapidly and shallow  Family a unspecified vomiting type     Dehydration     Anemia     Intractable vomiting     Intractable vomiting, presence of nausea not specified, unspecified vomiting type     Low back pain, unspecified back pain laterality, unspecified chronicity, with sciatica p

## 2018-05-05 NOTE — OCCUPATIONAL THERAPY NOTE
Pt is not appropriate to participate in OT therapy this date - on Bipap with QxH099% - will continue to follow to see tomorrow if deemed appropriate

## 2018-05-05 NOTE — PHYSICAL THERAPY NOTE
Pt was to be seen for PT treatment session. Consulted w/ RN. Per RN, pt is doing poorly on Bipap w/ FiO2 90%. Pt is not appropriate to work w/ therapy at this time. Will re-attempt tomorrow if appropriate.

## 2018-05-06 NOTE — PHYSICAL THERAPY NOTE
Chart reviewed       Pt with need for intubation on 5/05/18 ( decline and change in status )     Also note present hgb 6.7   --RN Trey Grullon contacted discussed chart review informing RN therapy will hold pt .        Therapy will hold at this time due to declin

## 2018-05-06 NOTE — CONSULTS
[facility]    Report of Consultation    Kanchan Olvera Patient Status:  Inpatient    1970 MRN [id number]   Location [facility] 2W/SW Attending Thanh Pond MD   [address] Day # 21 PCP Jesús Burciaga MD     Date of Admission:   malignancy (Abnormal)  Final Final Diagnosis:   Pleural fluid, left; aspiration:  Adequacy: Satisfactory for evaluation  General Category: Positive for malignancy  Diagnosis:  · Cytomorphologic features consistent with non-small cell carcinoma  Electronica injection 10 mL, 10 mL, Intravenous, PRN  •  0.9%  NaCl infusion, , Intravenous, Once  •  adult 3 in 1 TPN, , Intravenous, Continuous TPN  •  sodium chloride 0.9 % infusion, , ,   •  acetaminophen (TYLENOL) 650 MG rectal suppository 650 mg, 650 mg, Rectal, 10-8 MG/5ML liquid 5 mL, 5 mL, Oral, BID PRN  •  ondansetron HCl (ZOFRAN) injection 8 mg, 8 mg, Intravenous, 6x daily  •  Metoclopramide HCl (REGLAN) injection 10 mg, 10 mg, Intravenous, Q6H PRN  •  benzonatate (TESSALON) cap 100 mg, 100 mg, Oral, TID PRN turgor. Neurologic: Patient intubated and sedated.     Laboratory Data:    Lab Results  Component Value Date   WBC 12.0 (H) 05/06/2018   HGB 6.7 (LL) 05/06/2018   HCT 20.9 (L) 05/06/2018    05/06/2018   CREATSERUM 1.83 (H) 05/06/2018   BUN 48 (H) 05 Approved by (CST): Komal Dobson MD on 5/05/2018 at 16:20          Xr Chest Ap Portable  (cpt=71045)    Result Date: 5/5/2018  CONCLUSION:  1. Overall worsening in the appearance of the chest although exam is supine.  2. Increasing consolidation/ from the patient. Family has been helpful. He does not seem to have any pain prior to this. Patient is quite ill for many reasons. It seems unlikely that his deteriorating situation is secondary to cholecystitis.   However I did discuss with the patient

## 2018-05-06 NOTE — PROGRESS NOTES
[facility]ush[facility]  Progress Note    Danielle Fertile Patient Status:  Inpatient    1970 MRN [id number]   Location [facility] 2W/SW Attending Mary Kumar MD   Hosp Day # 21 PCP Lyubov Huddleston MD     Subjective:     Intubated  No new concerns       Object vomiting type     Dehydration     Anemia     Intractable vomiting     Intractable vomiting, presence of nausea not specified, unspecified vomiting type     Low back pain, unspecified back pain laterality, unspecified chronicity, with sciatica presence unsp

## 2018-05-06 NOTE — PLAN OF CARE
CARDIOVASCULAR - ADULT    • Maintains optimal cardiac output and hemodynamic stability Progressing    • Absence of cardiac arrhythmias or at baseline Progressing        DISCHARGE PLANNING    • Discharge to home or other facility with appropriate resources patient has stated prior to intubation that he wants everything done for him.

## 2018-05-06 NOTE — OCCUPATIONAL THERAPY NOTE
Pt is placed on hold from OT services at this time with a decline in medical status. Pt is now on vent with a critically low HgB. Will require new orders when appropriate.

## 2018-05-06 NOTE — PROGRESS NOTES
Assessment and Plan:     1. Atrial fibrillation  - currently SR  - on IV amio  2. Metastatic lung cancer  - S/p immunotherapy (opdivo 480) 5/1/18  3. Sepsis  4. Acute hypoxic respiratory failure      PLAN:  - continue IV amio      Subjective:      Intubat Date: 5/6/2018  CONCLUSION:  1. Endotracheal tube has been slightly advanced and is now in good position approximately 6.0 cm above the taisha. 2. Nasogastric tube projects to the stomach.  Right-sided central venous catheter with the tip at the cavoatrial

## 2018-05-06 NOTE — PROGRESS NOTES
Critical Care Progress Note     Assessment / Plan:  1. Acute hypoxic respiratory failure - due to cancer, pleural effusions and recent pneumonia. Improved after bilateral therapeutic thoracenteses. Now with acute worsening.  CTA chest with likely lymphangit

## 2018-05-06 NOTE — PROGRESS NOTES
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 SHAHEED, has been refusing intubation but now agree  - intubated by anesthesia on 5/5/18  - plan for trach if respiratory status improves to where he could safely tolerate surgery, currently on 100% FiO2, desats with any turning or movement    Severe low randi 5/1  - onc following     GOC  - FULL CODE  - would want intubation/CPR and all interventions  - would be interested in PEG for nutrition given poor PO intake     FEN  -TPN started, picc line placed     CODE: FULL      PPX;  SCD, karin held 5/2     Dispo Intravenous Daily   • Insulin Regular Human  1-5 Units Subcutaneous Q6H   • metoprolol Tartrate  50 mg Oral 2x Daily(Beta Blocker)   • lidocaine  1 patch Transdermal Daily   • ondansetron HCl  8 mg Intravenous 6x daily   • docusate sodium  100 mg Oral BID Right (cpt=32555)    Result Date: 4/23/2018  PROCEDURE: US THORACENTESIS GUIDED RIGHT (CPT=32555)  COMPARISON: [facility], CT CHEST PAIN/PE (IV ONLY) EM, 4/15/2018, 17:26.   [facility], XR CHEST AP PORTABLE (FCJ=59353), 4/21/ Xr Abdomen (1 View) (cpt=74018)    Result Date: 5/6/2018  CONCLUSION:  1. Large amount of stool in the right colon suggests a moderate constipation. 2. No dilated large or small bowel loops. No evidence of obstruction.  3. Nasogastric tube with the tip at 16:18     Approved by (CST): Emily Dobson MD on 5/05/2018 at 16:20          Xr Chest Ap Portable  (cpt=71045)    Result Date: 5/5/2018  CONCLUSION:  1. Overall worsening in the appearance of the chest although exam is supine.  2. Increasing co significant change in the appearance of bilateral pulmonary opacification and bibasilar pleural effusions.      Dictated by (CST): Antoinette Bajwa MD on 4/29/2018 at 10:39     Approved by (CST): Matt Dobson MD on 4/29/2018 at 10:41 on 4/23/2018 at 16:08     Approved by (CST): Tiana Armas MD on 4/23/2018 at 16:16          Xr Chest Ap Portable  (cpt=71045)    Result Date: 4/23/2018  CONCLUSION:   * No pneumothorax post right thoracentesis. * Improved aeration of the right lung base.

## 2018-05-06 NOTE — PLAN OF CARE
Problem: PAIN - ADULT  Goal: Verbalizes/displays adequate comfort level or patient's stated pain goal  INTERVENTIONS:  - Encourage pt to monitor pain and request assistance  - Assess pain using appropriate pain scale  - Administer analgesics based on type applicable  - Encourage broncho-pulmonary hygiene including cough, deep breathe, Incentive Spirometry  - Assess the need for suctioning and perform as needed  - Assess and instruct to report SOB or any respiratory difficulty  - Respiratory Therapy support arterial and/or venous puncture sites for bleeding and/or hematoma  - Assess quality of pulses, skin color and temperature  - Assess for signs of decreased coronary artery perfusion - ex.  Angina  - Evaluate fluid balance, assess for edema, trend weights

## 2018-05-07 NOTE — PROGRESS NOTES
Hematology/Oncology  Progress Note        NAME: Kayce Hagen - ROOM: [address] [place] [address] - MRN: [id number] - Age: 52year old - : 1970    Subjective   S/p intubation on Saturday now with worsening renal failure and possible cholecystitis     Medications: 1.2*   NA  136  143   --   145*   K  4.1  4.4   --   5.1   CL  102  112*   --   116*   CO2  26  25   --   22   ALKPHO   --    --   248*  336*   AST   --    --   43*  60*   ALT   --    --   19  21   BILT   --    --   1.5*  2.3*   TP   --    --   5.3*  5.4*

## 2018-05-07 NOTE — PROGRESS NOTES
[facility] Hospitalist Progress Note     PCP: Pascual Mullins MD    CC: Patient presents with:  Dyspnea KOBY SOB (respiratory)     Assessment/Plan:     Aracelis Donis a 52year old male with PMH sig for stage IIIB (T2N2MX) adenocarcinoma of the right upper lo at length with ONC, opdivo started but effect not likely for weeks  - patient wants all treatment available including intubation and trach if necessary  - was on BIPAP, has been refusing intubation but now agree  - intubated by anesthesia on 5/5/18  - plan chemo/malignancy.   - Hg 6.8 yesterday s/p 1 unit pRBC     Stage IIIB (T2N2MX) adenocarcinoma of the right upper lobe s/p concurrent chemo/XRT  -diffuse bone mets noted in early April  -6000cGY in 46 fractions; 11/13/17-12/29/17, cis/alimta on 11/10/17 and  Bari Hurtado filed at 05/07/18 0600   Gross per 24 hour   Intake             3943 ml   Output             1900 ml   Net             2043 ml       Last 3 Weights  05/07/18 0600 : 142 lb (64.4 kg)  05/06/18 0600 : 141 lb 12.8 oz (64.3 kg)  05/04/18 0500 : ipratropium-albuterol, PEG 3350, magnesium hydroxide, bisacodyl    Data Review:       Labs:     Recent Labs   Lab  05/05/18   0522  05/06/18   0508  05/06/18   2259  05/07/18   0527   WBC  13.3*  12.0*   --   12.9*   HGB  7.1*  6.7*  7.7*  7.6*   MCV  88. 2 4/23/2018 at 12:01          Ir Biopsy    Result Date: 4/4/2018  PROCEDURE: IR BONE BIOPSY  COMPARISON: None.   INDICATIONS: History of stage IIIB lung cancer, with bone lesions suspicious for metastasis  FINDINGS: Following informed consent, the patient was Kena Gomez MD on 4/24/2018 at 8:36          Us Thoracentesis Guided Left (cpt=32555)    Result Date: 4/24/2018  CONCLUSION:  Ultrasound-guided left lung thoracentesis     Dictated by (CST): Ajit Martinez MD on 4/24/2018 at 14:57     Approved by (CS (cpt=71045)    Result Date: 5/2/2018  CONCLUSION:  1. Interval decrease in bibasilar airspace disease with a moderate amount still remaining. Loculated right pleural effusion with loculated fluid at the right apex now identified.  Blunting of the left costo Dictated by (CST): Chano Corcoran MD on 4/27/2018 at 10:31     Approved by (CST): Chano Corcoran MD on 4/27/2018 at 10:36          Xr Chest Ap Portable  (cpt=71045)    Result Date: 4/26/2018  CONCLUSION:  1.  Perhaps slight increase in consolidation/atelect 4/30/2018  CONCLUSION:   No pulmonary embolus in the central, main, lobar, segmental pulmonary arteries. Nondiagnostic in the subsegmental pulmonary arteries due to respiratory motion artifact. Findings suggestive of moderate pulmonary edema.  Lymphangitic

## 2018-05-07 NOTE — PLAN OF CARE
Problem: PAIN - ADULT  Goal: Verbalizes/displays adequate comfort level or patient's stated pain goal  INTERVENTIONS:  - Encourage pt to monitor pain and request assistance  - Assess pain using appropriate pain scale  - Administer analgesics based on type preferences of patient/family/discharge partner  - Complete POLST form as appropriate  - Assess patient's ability to be responsible for managing their own health  - Refer to Case Management Department for coordinating discharge planning if the patient need level they choose  - Honor patient and family perspectives and choices    Outcome: Progressing  Pt has expressed the want to do everything, his wishes are being followed.      Problem: GASTROINTESTINAL - ADULT  Goal: Minimal or absence of nausea and vomitin restrictive restraint to prevent harm  - Notify patient and family of reasons restraints applied  - Assess for any contributing factors to confusion (electrolyte disturbances, delirium, medications)  - Discontinue any unnecessary medical devices as soon as

## 2018-05-07 NOTE — PROGRESS NOTES
[place] [facility]    Progress Note    Pop Parsons Patient Status:  Inpatient    1970 MRN [id number]   Location [facility] 2W/SW Attending Virgie Reyna MD   Hosp Day # 25 PCP Rito Lema MD            Subjective:     Pt sedated  EXTREMITIES: no leg swelling, no calf tenderness,   NEUROLOGIC: alert and oriented x 3; affect appropriate        Results:       Recent Labs   Lab  05/05/18   0522  05/06/18   0508  05/06/18   2259  05/07/18   0527   RBC  2.49*  2.32*   --   2.65* 5/6/2018  CONCLUSION:  1. Endotracheal tube has been slightly advanced and is now in good position approximately 6.0 cm above the taisha. 2. Nasogastric tube projects to the stomach.  Right-sided central venous catheter with the tip at the cavoatrial juncti understand this.       Darien Us MD [facility]  General Surgery  [facility]  5/7/2018  12:19 PM

## 2018-05-07 NOTE — PAYOR COMM NOTE
--------------  CONTINUED STAY REVIEW    Payor: Thanh Boyer  #:  [id number]  Authorization Number: Trommald Dikes date: 4/15/18  Admit time: Amy Page 1527    Admitting Physician: Amelia Horton MD  Attending Physician:  Jose Hanna MD  Pr Date Action Dose Route User    5/7/2018 0909 Given 100 mg Oral Saud Maciel, RN      fentaNYL citrate (SUBLIMAZE) 1,000 mcg in sodium chloride 0.9 % 100 mL infusion     Date Action Dose Route User    5/7/2018 1403 Hi-Risk Rate/Dose Change 25 mcg/hr Int 5/6/2018 1738 Given 3 mL Nebulization Elio Monreal RCJOE      LORazepam (ATIVAN) injection 2 mg     Date Action Dose Route User    5/7/2018 1245 Given 2 mg Intravenous Drew Cox, RN      LORazepam (ATIVAN) 40 mg in dextrose 5 % 200 mL infusion 5/7/2018 1230 Hi-Risk Rate/Dose Change 6 mg/hr Intravenous Candie Villarreal, RN    5/7/2018 1219 Hi-Risk Rate/Dose Change 5 mg/hr Intravenous Candie Villarreal, RN    5/7/2018 1215 Hi-Risk Rate/Dose Verify 4 mg/hr Intravenous Candie Villarreal, RN    5/7/2018 5/6/2018 2054 Given 8 mg Intravenous Christen Stevens RN    5/6/2018 1718 Given 8 mg Intravenous Imani Urbano, RN      Pantoprazole Sodium (PROTONIX) 40 mg in Sodium Chloride 0.9 % 10 mL IV push     Date Action Dose Route User    5/7/2018 0945 Given 4 5/6/2018 2230 New Bag 3.375 g Intravenous Carlos Corrigan RN      propofol (DIPRIVAN) infusion     Date Action Dose Route User    5/7/2018 1406 Rate/Dose Change 10 mcg/kg/min × 68.8 kg Intravenous Indu Gusman RN    5/7/2018 1230 Rate/Dose Change 20 m Related Notes: Original Note by Yunior Herrera MD (Physician) filed at 5/7/2018  9:45 AM   []Manual[]Template  []Copied     [facility] Hospitalist Progress Note      PCP: Mehul Palm MD     CC: Patient presents with:  Dyspnea KOBY SOB (respiratory)      Ass - decomp likely due to lymphangitic spread, bronch with biopsy (to confirm) would significantly increase risk of decomp and demise at this time, atypical infection less likely but pulm will check for fungal serologies and cultures   - repeat ECHO, but BNP - palliative care consulted for symptom management     Bilious output from OG  - KUB with stool burden  - RUQ US with ?acute vs. Chronic cholecystitis   - Gen surg consulted, unlikely acute issue      Hyponatremia, chronic  - improved, but has been increas Update: Family has elected for DNR code status and plan for terminal extubation later today.      Objective      OBJECTIVE:  Temp:  [99.3 °F (37.4 °C)-101.7 °F (38.7 °C)] 101 °F (38.3 °C)  Pulse:  [108-134] 112  Resp:  [17-22] 20  BP: ()/(53-68) 91/5 • fentanyl (SUBLIMAZE) infusion 100 mcg/hr (05/07/18 9388)   • dextrose        acetaminophen, acetaminophen, morphINE sulfate, Normal Saline Flush, dextrose, diazepam, DiphenhydrAMINE HCl, LORazepam, Cyclobenzaprine HCl, Hydrocod Polst-CPM Polst ER, Metocl PROCEDURE:  US THORACENTESIS GUIDED RIGHT (CPT=32555)  COMPARISON:   [facility], CT CHEST PAIN/PE (IV ONLY) EM, 4/15/2018, 17:26. [facility], XR CHEST AP PORTABLE (CPT=71045), 4/21/2018, 14:37.   INDICATIONS:        Right ef CONCLUSION:  Fluoroscopic guided biopsy of L4 vertebral body  Dictated by (CST): Radhika Bustamante MD on 4/04/2018 at 17:12     Approved by (CST): Radhika Bustamante MD on 4/04/2018 at 17:18              Xr Abdomen (1 View) (cpt=74018)     Result Date: 5/6/2018 CONCLUSION:  1. Endotracheal tube has been slightly advanced and is now in good position approximately 6.0 cm above the taisha. 2. Nasogastric tube projects to the stomach. Right-sided central venous catheter with the tip at the cavoatrial junction.  No pne CONCLUSION:  1. Worsening right lung base airspace disease, with persistent moderate left lung base airspace disease. Small bilateral pleural effusions have worsened bilaterally.  Heart size upper limits of normal, and there is mild pulmonary congestive fr[place] CONCLUSION:   *          No complications status post left thoracentesis. * Significant improvement in aeration of the left lung base from exam earlier the same day. *            Heart size appears normal. *           Right lung is stable.              Dict CONCLUSION:   No pulmonary embolus in the central, main, lobar, segmental pulmonary arteries. Nondiagnostic in the subsegmental pulmonary arteries due to respiratory motion artifact. Findings suggestive of moderate pulmonary edema.  Lymphangitic carcinomat

## 2018-05-07 NOTE — PROGRESS NOTES
Dr. Javan Ahmadi updated on increase of temp; advised at this time we will be holding off on blood and giving tylenol. Will continue to monitor patient.

## 2018-05-07 NOTE — PROGRESS NOTES
Due to lack of IV access pharmacist was consulted regarding compatibility of multiple medications; we were advised that propofol, fentanyl, and amiodorone can be run together. Will continue to monitor patient.

## 2018-05-07 NOTE — PROGRESS NOTES
[facility]    CDI Prediction Tool Protocol (Vancomycin Initiated)    OVP (oral vancomycin prophylaxis) 125 mg PO Daily is being started in this patient based on a score of 14.       Score Breakdown:  High risk antibiotic use (5 points)  Hospital

## 2018-05-07 NOTE — PLAN OF CARE
Safety Risk - Non-Violent Restraints    • Patient will remain free from self-harm Completed          Restraints were discontinued and removed right at 0700 during shift change.  Patient is sedated and comfortable and knows to not reach for his ET tub or emmett

## 2018-05-07 NOTE — CONSULTS
[id number] Beth Méndez Patient Status:  Inpatient    1970 MRN [id number]   Location [facility] 2W/SW Attending Kami Bruce MD   Hosp Day # 25 PCP Mary Curry MD     Date of Consult: sodium chloride 0.9 % 250 mL infusion, 100-200 mcg/min, Intravenous, Continuous  •  adult 3 in 1 TPN, , Intravenous, Continuous TPN  •  acetaminophen (TYLENOL) 650 MG rectal suppository 650 mg, 650 mg, Rectal, Q6H PRN  •  Piperacillin Sod-Tazobactam So (ZO Intravenous, Q6H PRN  •  guaiFENesin-codeine (ROBITUSSIN AC) 100-10 MG/5ML syrup 5 mL, 5 mL, Oral, Q4H PRN  •  Normal Saline Flush 0.9 % injection 3 mL, 3 mL, Intravenous, PRN  •  acetaminophen (TYLENOL) tab 650 mg, 650 mg, Oral, Q6H PRN  •  ipratropium-al Bri Rose MD on 5/06/2018 at 10:45          Xr Chest Ap Portable  (cpt=71045)    Result Date: 5/7/2018  CONCLUSION:  1. No significant change in the appearance of bilateral pulmonary opacification and bilateral pleural effusions.      Dictated by (CS signs of pain present: NO    Physical Exam:  General: Sedated, intubated on mechanical ventilation    Palliative Performance Scale : 40%--->20    Palliative Care Goals of Care:  Discussed with Family: YES  Patient's preference about sharing medical informa Puma's clinical condition which may acutely clinically deteriorate. They verbalized understanding this. They agreed to talk about this option and then contact Dr Ingrid Davidson with their decision. I will continue to follow clinically.       ADDENDUM: Wife

## 2018-05-07 NOTE — PROGRESS NOTES
ASSESSMENT/PLAN:    Impression:  1. resp failure on vent and 100% fio2;  Multiple pressors;   1. Sinus tachycardia in a 52year-old with multiple issues including metastatic lung cancer, anemia, pain from his mets.   He does not appear to have any struc hematochezia. :denies hematuria. INTEG:no new rashes. MS:no limiting arthritis. NEURO:no localized deficits. HEME/LYMPH:denies easy bruising. ALL:no new allergies. ROS otherwise negative except as in HPI.   EXAM:   BP 91/56   Pulse 112   Temp 101 °F (38.3

## 2018-05-08 NOTE — DISCHARGE SUMMARY
General Medicine Discharge Summary     Patient ID:  Deloris Hankins  52year old  5/25/1970    Admit date: 4/14/2018    Discharge date and time: 05/07/18    Attending Physician: Cindy Babcock MD     Primary Care Physician: Willie Granger MD     Reason Family meeting with palliative care held with wife and parents on 18. Now DNR. Terminally extubated.  Patient  18 at 3:55PM.     Acute hypoxic respiratory failure-suspect multifactorial, pna, pulmonary edema, lymphangitic spread of lung cance possibly ATN from hypotension  - renal consult     afib / Wilson Kumar - currently in sinus tachycardia  - noted on tele 5/4 with rapid rate  - dilt drip started per cards, will wean off as now in sinus tachycardia  - back in Afib 5/5PM, started on amiodarone

## 2018-05-09 NOTE — PAYOR COMM NOTE
--------------  DISCHARGE REVIEW    Payor: Thanh Boyer  #:  [id number]  Authorization Number: Princess Jose date: 4/15/18  Admit time:  0143  Discharge Date: 5/7/2018  3:55 PM     Admitting Physician: Roberto Li MD  Attending Physi not entirely clear.  Treating for pneumonia.  Dopplers neg for DVT.  CT chest neg for PE, + pulm edema  Procal mildly elevated.  Echo with normal EF. S/p thoracentesis on R 4/23 and L 4/24 with some improvement.  Worsening SOB on 4/29 overnight, placed on 1 palliative care consulted for symptom management, increased MS contin 4/27 to 90 mg BID, decreased to 60 mg BID on 4/28 due to increased lethargy, increased to 75 mg BID 4/30, pain controlled at this time  - was on morphine gtt, will stop as currently [facility].  Family would like to talk about it and pray before making any decisions.   - Family has decided on DNR code status, plan for terminal extubation later today    Total Time Coordinating Care: Greater than 30 minutes    Doris Dwyer MD  [facility]

## 2018-05-09 NOTE — PAYOR COMM NOTE
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 Lawyer Carballo on 5/7/18. Family tearful but understand gravity of situation.    - time spent >35 mins with family  - Disc

## 2020-07-24 NOTE — PROGRESS NOTES
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 s/p concurrent chemo/XRT last round of ch family given opportunity to ask questions and note understanding and agreeing with therapeutic plan as outlined     Everton Matos MD  [facility] Hospitalist     Answering Service number: [phone]    Time spent with patient: 35 mins with > 50% spent counse 2328  04/15/18   0444  04/16/18 0459  04/17/18   0512   WBC  7.6  7.4  7.3  7.4   HGB  8.9*  8.4*  8.8*  8.0*   MCV  91.5  91.9  91.8  90.2   PLT  417*  387  391  332       Recent Labs   Lab  04/14/18   2328  04/15/18   0444  04/16/18   0459  04/17/18 no weight-bearing restrictions

## (undated) DEVICE — 3M™ RED DOT™ MONITORING ELECTRODE WITH FOAM TAPE AND STICKY GEL, 50/BAG, 20/CASE, 72/PLT 2570: Brand: RED DOT™

## (undated) DEVICE — SINGLE USE SUCTION VALVE MAJ-209: Brand: SINGLE USE SUCTION VALVE (STERILE)

## (undated) DEVICE — LENS PLASTIC DISP EYEWEAR

## (undated) DEVICE — SYRINGE 10ML SLIP TIP

## (undated) DEVICE — 60 ML SYRINGE REGULAR TIP: Brand: MONOJECT

## (undated) DEVICE — LENS FRAMES DISP EYEWEAR

## (undated) DEVICE — ENDOSCOPY PACK UPPER: Brand: MEDLINE INDUSTRIES, INC.

## (undated) DEVICE — GLOVE SURG TRIUMPH SZ 71/2

## (undated) DEVICE — MASK ISOLATION

## (undated) DEVICE — SINGLE USE BIOPSY VALVE MAJ-210: Brand: SINGLE USE BIOPSY VALVE (STERILE)

## (undated) DEVICE — NEEDLE ASP VIZISHOT 22GA 1.8MM

## (undated) DEVICE — 1200CC GUARDIAN II: Brand: GUARDIAN

## (undated) DEVICE — BOWLS UTILITY 16OZ

## (undated) DEVICE — FILTERLINE NASAL ADULT O2/CO2

## (undated) DEVICE — MEDI-VAC SUCTION HANDLE REGULAR CAPACITY: Brand: CARDINAL HEALTH

## (undated) DEVICE — MEDI-VAC NON-CONDUCTIVE SUCTION TUBING: Brand: CARDINAL HEALTH

## (undated) DEVICE — KENDALL SCD EXPRESS SLEEVES, KNEE LENGTH, MEDIUM: Brand: KENDALL SCD

## (undated) NOTE — LETTER
[address], [place]  Authorization for Invasive Procedures  1.  I hereby authorize Dr. Irving Rodríguez , my physician and whomever may be designated as the doctor's assistant, to perform the following operation and/or procedure:  *** on Felipe performed for the purposes of advancing medicine, science, and/or education, provided my identity is not revealed. If the procedure has been videotaped, the physician/surgeon will obtain the original videotape.  The hospital will not be responsible for stor My signature below affirms that prior to the time of the procedure, I have explained to the patient and/or his legal representative, the risks and benefits involved in the proposed treatment and any reasonable alternative to the proposed treatment.  I have

## (undated) NOTE — LETTER
[address], [place]  Authorization for Invasive Procedures  1.  I hereby authorize Dr. Krista Alpers , my physician and whomever may be designated as the doctor's assistant, to perform the following operation and/or procedure:  U performed for the purposes of advancing medicine, science, and/or education, provided my identity is not revealed. If the procedure has been videotaped, the physician/surgeon will obtain the original videotape.  The hospital will not be responsible for stor My signature below affirms that prior to the time of the procedure, I have explained to the patient and/or his legal representative, the risks and benefits involved in the proposed treatment and any reasonable alternative to the proposed treatment.  I have